# Patient Record
Sex: MALE | Race: OTHER | NOT HISPANIC OR LATINO | Employment: UNEMPLOYED | URBAN - METROPOLITAN AREA
[De-identification: names, ages, dates, MRNs, and addresses within clinical notes are randomized per-mention and may not be internally consistent; named-entity substitution may affect disease eponyms.]

---

## 2017-04-23 ENCOUNTER — HOSPITAL ENCOUNTER (EMERGENCY)
Facility: MEDICAL CENTER | Age: 32
End: 2017-04-23
Attending: EMERGENCY MEDICINE
Payer: MEDICAID

## 2017-04-23 VITALS
TEMPERATURE: 96.9 F | SYSTOLIC BLOOD PRESSURE: 100 MMHG | WEIGHT: 153.22 LBS | DIASTOLIC BLOOD PRESSURE: 76 MMHG | RESPIRATION RATE: 16 BRPM | BODY MASS INDEX: 21.45 KG/M2 | HEART RATE: 90 BPM | OXYGEN SATURATION: 99 % | HEIGHT: 71 IN

## 2017-04-23 DIAGNOSIS — R10.13 EPIGASTRIC PAIN: ICD-10-CM

## 2017-04-23 DIAGNOSIS — D72.829 LEUKOCYTOSIS, UNSPECIFIED TYPE: ICD-10-CM

## 2017-04-23 LAB
ALBUMIN SERPL BCP-MCNC: 4.3 G/DL (ref 3.2–4.9)
ALBUMIN/GLOB SERPL: 1.6 G/DL
ALP SERPL-CCNC: 93 U/L (ref 30–99)
ALT SERPL-CCNC: 13 U/L (ref 2–50)
ANION GAP SERPL CALC-SCNC: 9 MMOL/L (ref 0–11.9)
AST SERPL-CCNC: 12 U/L (ref 12–45)
BASOPHILS # BLD AUTO: 0.3 % (ref 0–1.8)
BASOPHILS # BLD: 0.04 K/UL (ref 0–0.12)
BILIRUB SERPL-MCNC: 1.6 MG/DL (ref 0.1–1.5)
BUN SERPL-MCNC: 10 MG/DL (ref 8–22)
CALCIUM SERPL-MCNC: 9.5 MG/DL (ref 8.5–10.5)
CHLORIDE SERPL-SCNC: 100 MMOL/L (ref 96–112)
CO2 SERPL-SCNC: 24 MMOL/L (ref 20–33)
CREAT SERPL-MCNC: 0.67 MG/DL (ref 0.5–1.4)
EKG IMPRESSION: NORMAL
EOSINOPHIL # BLD AUTO: 0.02 K/UL (ref 0–0.51)
EOSINOPHIL NFR BLD: 0.1 % (ref 0–6.9)
ERYTHROCYTE [DISTWIDTH] IN BLOOD BY AUTOMATED COUNT: 42.5 FL (ref 35.9–50)
GFR SERPL CREATININE-BSD FRML MDRD: >60 ML/MIN/1.73 M 2
GLOBULIN SER CALC-MCNC: 2.7 G/DL (ref 1.9–3.5)
GLUCOSE SERPL-MCNC: 119 MG/DL (ref 65–99)
HCT VFR BLD AUTO: 46.4 % (ref 42–52)
HGB BLD-MCNC: 16.3 G/DL (ref 14–18)
IMM GRANULOCYTES # BLD AUTO: 0.07 K/UL (ref 0–0.11)
IMM GRANULOCYTES NFR BLD AUTO: 0.5 % (ref 0–0.9)
LIPASE SERPL-CCNC: 8 U/L (ref 11–82)
LYMPHOCYTES # BLD AUTO: 1.39 K/UL (ref 1–4.8)
LYMPHOCYTES NFR BLD: 9.6 % (ref 22–41)
MCH RBC QN AUTO: 31.6 PG (ref 27–33)
MCHC RBC AUTO-ENTMCNC: 35.1 G/DL (ref 33.7–35.3)
MCV RBC AUTO: 89.9 FL (ref 81.4–97.8)
MONOCYTES # BLD AUTO: 1.17 K/UL (ref 0–0.85)
MONOCYTES NFR BLD AUTO: 8.1 % (ref 0–13.4)
NEUTROPHILS # BLD AUTO: 11.8 K/UL (ref 1.82–7.42)
NEUTROPHILS NFR BLD: 81.4 % (ref 44–72)
NRBC # BLD AUTO: 0 K/UL
NRBC BLD AUTO-RTO: 0 /100 WBC
PLATELET # BLD AUTO: 220 K/UL (ref 164–446)
PMV BLD AUTO: 9.9 FL (ref 9–12.9)
POTASSIUM SERPL-SCNC: 3.8 MMOL/L (ref 3.6–5.5)
PROT SERPL-MCNC: 7 G/DL (ref 6–8.2)
RBC # BLD AUTO: 5.16 M/UL (ref 4.7–6.1)
SODIUM SERPL-SCNC: 133 MMOL/L (ref 135–145)
WBC # BLD AUTO: 14.5 K/UL (ref 4.8–10.8)

## 2017-04-23 PROCEDURE — 700102 HCHG RX REV CODE 250 W/ 637 OVERRIDE(OP): Performed by: EMERGENCY MEDICINE

## 2017-04-23 PROCEDURE — 93005 ELECTROCARDIOGRAM TRACING: CPT

## 2017-04-23 PROCEDURE — 83690 ASSAY OF LIPASE: CPT

## 2017-04-23 PROCEDURE — 85025 COMPLETE CBC W/AUTO DIFF WBC: CPT

## 2017-04-23 PROCEDURE — A9270 NON-COVERED ITEM OR SERVICE: HCPCS | Performed by: EMERGENCY MEDICINE

## 2017-04-23 PROCEDURE — 80053 COMPREHEN METABOLIC PANEL: CPT

## 2017-04-23 PROCEDURE — 99284 EMERGENCY DEPT VISIT MOD MDM: CPT

## 2017-04-23 RX ORDER — FAMOTIDINE 20 MG/1
20 TABLET, FILM COATED ORAL 2 TIMES DAILY
Status: DISCONTINUED | OUTPATIENT
Start: 2017-04-23 | End: 2017-04-23

## 2017-04-23 RX ADMIN — LIDOCAINE HYDROCHLORIDE 30 ML: 20 SOLUTION OROPHARYNGEAL at 04:09

## 2017-04-23 ASSESSMENT — PAIN SCALES - GENERAL: PAINLEVEL_OUTOF10: 10

## 2017-04-23 NOTE — ED AVS SNAPSHOT
Home Care Instructions                                                                                                                Justice Ortega   MRN: [id number]    Department:  [facility], Emergency Dept   Date of Visit:  4/23/2017            [facility], Emergency Dept    [address] [place] [address]    Phone:  [phone]      You were seen by     Bill Ross M.D.      Your Diagnosis Was     Epigastric pain     R10.13       These are the medications you received during your hospitalization from 04/23/2017 0208 to 04/23/2017 0549     Date/Time Order Dose Route Action    04/23/2017 0409 hyoscyamine-maalox plus-lidocaine viscous (GI COCKTAIL) oral susp 30 mL 30 mL Oral Given      Follow-up Information     1. Follow up with [facility], Emergency Dept.    Specialty:  Emergency Medicine    Why:  If symptoms worsen    Contact information    [address] [place] [id number] [phone]        2. Follow up with [facility] In 1 day.    Why:  for a recheck    Contact information    [address] [place] [address] [phone]      Medication Information     Review all of your home medications and newly ordered medications with your primary doctor and/or pharmacist as soon as possible. Follow medication instructions as directed by your doctor and/or pharmacist.     Please keep your complete medication list with you and share with your physician. Update the information when medications are discontinued, doses are changed, or new medications (including over-the-counter products) are added; and carry medication information at all times in the event of emergency situations.               Medication List      ASK your doctor about these medications        Instructions    Morning Afternoon Evening Bedtime    hydrocodone-acetaminophen 5-325 MG Tabs per tablet   Commonly known as:  NORCO        Take 1-2 Tabs by mouth every 6  hours as needed.   Dose:  1-2 Tab                                Procedures and tests performed during your visit     CBC WITH DIFFERENTIAL    COMP METABOLIC PANEL    EKG (NOW)    ESTIMATED GFR    IV Saline Lock    LIPASE        Discharge Instructions       Abdominal Pain (Nonspecific)  Your exam might not show the exact reason you have abdominal pain. Since there are many different causes of abdominal pain, another checkup and more tests may be needed. It is very important to follow up for lasting (persistent) or worsening symptoms. A possible cause of abdominal pain in any person who still has his or her appendix is acute appendicitis. Appendicitis is often hard to diagnose. Normal blood tests, urine tests, ultrasound, and CT scans do not completely rule out early appendicitis or other causes of abdominal pain. Sometimes, only the changes that happen over time will allow appendicitis and other causes of abdominal pain to be determined. Other potential problems that may require surgery may also take time to become more apparent. Because of this, it is important that you follow all of the instructions below.  HOME CARE INSTRUCTIONS   · Rest as much as possible.   · Do not eat solid food until your pain is gone.   · While adults or children have pain: A diet of water, weak decaffeinated tea, broth or bouillon, gelatin, oral rehydration solutions (ORS), frozen ice pops, or ice chips may be helpful.   · When pain is gone in adults or children: Start a light diet (dry toast, crackers, applesauce, or white rice). Increase the diet slowly as long as it does not bother you. Eat no dairy products (including cheese and eggs) and no spicy, fatty, fried, or high-fiber foods.   · Use no alcohol, caffeine, or cigarettes.   · Take your regular medicines unless your caregiver told you not to.   · Take any prescribed medicine as directed.   · Only take over-the-counter or prescription medicines for pain, discomfort, or fever as  directed by your caregiver. Do not give aspirin to children.   If your caregiver has given you a follow-up appointment, it is very important to keep that appointment. Not keeping the appointment could result in a permanent injury and/or lasting (chronic) pain and/or disability. If there is any problem keeping the appointment, you must call to reschedule.   SEEK IMMEDIATE MEDICAL CARE IF:   · Your pain is not gone in 24 hours.   · Your pain becomes worse, changes location, or feels different.   · You or your child has an oral temperature above 102° F (38.9° C), not controlled by medicine.   · Your baby is older than 3 months with a rectal temperature of 102° F (38.9° C) or higher.   · Your baby is 3 months old or younger with a rectal temperature of 100.4° F (38° C) or higher.   · You have shaking chills.   · You keep throwing up (vomiting) or cannot drink liquids.   · There is blood in your vomit or you see blood in your bowel movements.   · Your bowel movements become dark or black.   · You have frequent bowel movements.   · Your bowel movements stop (become blocked) or you cannot pass gas.   · You have bloody, frequent, or painful urination.   · You have yellow discoloration in the skin or whites of the eyes.   · Your stomach becomes bloated or bigger.   · You have dizziness or fainting.   · You have chest or back pain.   MAKE SURE YOU:   · Understand these instructions.   · Will watch your condition.   · Will get help right away if you are not doing well or get worse.   Document Released: 12/18/2006 Document Revised: 03/11/2013 Document Reviewed: 11/15/2010  ExitCare® Patient Information ©2013 [employer].          Patient Information     Patient Information    Following emergency treatment: all patient requiring follow-up care must return either to a private physician or a clinic if your condition worsens before you are able to obtain further medical attention, please return to the emergency room.     Billing  Information    At [facility], we work to make the billing process streamlined for our patients.  Our Representatives are here to answer any questions you may have regarding your hospital bill.  If you have insurance coverage and have supplied your insurance information to us, we will submit a claim to your insurer on your behalf.  Should you have any questions regarding your bill, we can be reached online or by phone as follows:  Online: You are able pay your bills online or live chat with our representatives about any billing questions you may have. We are here to help Monday - Friday from 8:00am to 7:30pm and 9:00am - 12:00pm on Saturdays.  Please visit [email or web address]  for more information.   Phone:  [phone] or [phone]    Please note that your emergency physician, surgeon, pathologist, radiologist, anesthesiologist, and other specialists are not employed by [facility] and will therefore bill separately for their services.  Please contact them directly for any questions concerning their bills at the numbers below:     Emergency Physician Services:  [phone]  [facility]:  [phone]  [facility]:  [phone]  [facility]:  [phone]    1. Your final bill may vary from the amount quoted upon discharge if all procedures are not complete at that time, or if your doctor has additional procedures of which we are not aware. You will receive an additional bill if you return to the Emergency Department at [facility] for suture removal regardless of the facility of which the sutures were placed.     2. Please arrange for settlement of this account at the emergency registration.    3. All self-pay accounts are due in full at the time of treatment.  If you are unable to meet this obligation then payment is expected within 4-5 days.     4. If you have had radiology studies (CT, X-ray, Ultrasound, MRI), you have  received a preliminary result during your emergency department visit. Please contact the radiology department [phone] to receive a copy of your final result. Please discuss the Final result with your primary physician or with the follow up physician provided.     Crisis Hotline:  [place] Crisis Hotline:  0-102-RMLENYF or 1-344.301.3749  [place] Crisis Hotline:    1-949.621.8434 or 566-265-4826         ED Discharge Follow Up Questions    1. In order to provide you with very good care, we would like to follow up with a phone call in the next few days.  May we have your permission to contact you?     YES /  NO    2. What is the best phone number to call you? (       )_____-__________    3. What is the best time to call you?      Morning  /  Afternoon  /  Evening                   Patient Signature:  ____________________________________________________________    Date:  ____________________________________________________________

## 2017-04-23 NOTE — ED AVS SNAPSHOT
MapMy[employer]itness Access Code: [email or web address]  Expires: 5/23/2017  5:49 AM    Your email address is not on file at [facility].  Email Addresses are required for you to sign up for [employer]My[employer], please contact [phone] to verify your personal information and to provide your email address prior to attempting to register for MapMy[employer]itness.    Justice Ortega  [address] [place] [address], [place] [address]    Careers[employer]  A secure, online tool to manage your health information     [facility]’s MapMyFitness® is a secure, online tool that connects you to your personalized health information from the privacy of your home -- day or night - making it very easy for you to manage your healthcare. Once the activation process is completed, you can even access your medical information using the MapMyFitness kiera, which is available for free in the Apple Kiera store or Google Play store.     To learn more about MapMyFitness, visit [email or web address]    There are two levels of access available (as shown below):   My Chart Features  [facility] Primary Care Doctor [facility]  Specialists [facility]  Urgent  Care Non-[facility] Primary Care Doctor   Email your healthcare team securely and privately 24/7 X X X    Manage appointments: schedule your next appointment; view details of past/upcoming appointments X      Request prescription refills. X      View recent personal medical records, including lab and immunizations X X X X   View health record, including health history, allergies, medications X X X X   Read reports about your outpatient visits, procedures, consult and ER notes X X X X   See your discharge summary, which is a recap of your hospital and/or ER visit that includes your diagnosis, lab results, and care plan X X  X     How to register for Careers[employer]:  Once your e-mail address has been verified, follow the following steps to sign up for Careers[employer].     1. Go to  [email or web address]  2. Click on the Sign Up Now box, which takes you to the New Member Sign Up page. You will need  to provide the following information:  a. Enter your "[employer] Access Code exactly as it appears at the top of this page. (You will not need to use this code after you’ve completed the sign-up process. If you do not sign up before the expiration date, you must request a new code.)   b. Enter your date of birth.   c. Enter your home email address.   d. Click Submit, and follow the next screen’s instructions.  3. Create a "[employer] ID. This will be your "[employer] login ID and cannot be changed, so think of one that is secure and easy to remember.  4. Create a "[employer] password. You can change your password at any time.  5. Enter your Password Reset Question and Answer. This can be used at a later time if you forget your password.   6. Enter your e-mail address. This allows you to receive e-mail notifications when new information is available in "[employer].  7. Click Sign Up. You can now view your health information.    For assistance activating your "[employer] account, call [phone]

## 2017-04-23 NOTE — DISCHARGE INSTRUCTIONS
Abdominal Pain (Nonspecific)  Your exam might not show the exact reason you have abdominal pain. Since there are many different causes of abdominal pain, another checkup and more tests may be needed. It is very important to follow up for lasting (persistent) or worsening symptoms. A possible cause of abdominal pain in any person who still has his or her appendix is acute appendicitis. Appendicitis is often hard to diagnose. Normal blood tests, urine tests, ultrasound, and CT scans do not completely rule out early appendicitis or other causes of abdominal pain. Sometimes, only the changes that happen over time will allow appendicitis and other causes of abdominal pain to be determined. Other potential problems that may require surgery may also take time to become more apparent. Because of this, it is important that you follow all of the instructions below.  HOME CARE INSTRUCTIONS   · Rest as much as possible.   · Do not eat solid food until your pain is gone.   · While adults or children have pain: A diet of water, weak decaffeinated tea, broth or bouillon, gelatin, oral rehydration solutions (ORS), frozen ice pops, or ice chips may be helpful.   · When pain is gone in adults or children: Start a light diet (dry toast, crackers, applesauce, or white rice). Increase the diet slowly as long as it does not bother you. Eat no dairy products (including cheese and eggs) and no spicy, fatty, fried, or high-fiber foods.   · Use no alcohol, caffeine, or cigarettes.   · Take your regular medicines unless your caregiver told you not to.   · Take any prescribed medicine as directed.   · Only take over-the-counter or prescription medicines for pain, discomfort, or fever as directed by your caregiver. Do not give aspirin to children.   If your caregiver has given you a follow-up appointment, it is very important to keep that appointment. Not keeping the appointment could result in a permanent injury and/or lasting (chronic) pain  and/or disability. If there is any problem keeping the appointment, you must call to reschedule.   SEEK IMMEDIATE MEDICAL CARE IF:   · Your pain is not gone in 24 hours.   · Your pain becomes worse, changes location, or feels different.   · You or your child has an oral temperature above 102° F (38.9° C), not controlled by medicine.   · Your baby is older than 3 months with a rectal temperature of 102° F (38.9° C) or higher.   · Your baby is 3 months old or younger with a rectal temperature of 100.4° F (38° C) or higher.   · You have shaking chills.   · You keep throwing up (vomiting) or cannot drink liquids.   · There is blood in your vomit or you see blood in your bowel movements.   · Your bowel movements become dark or black.   · You have frequent bowel movements.   · Your bowel movements stop (become blocked) or you cannot pass gas.   · You have bloody, frequent, or painful urination.   · You have yellow discoloration in the skin or whites of the eyes.   · Your stomach becomes bloated or bigger.   · You have dizziness or fainting.   · You have chest or back pain.   MAKE SURE YOU:   · Understand these instructions.   · Will watch your condition.   · Will get help right away if you are not doing well or get worse.   Document Released: 12/18/2006 Document Revised: 03/11/2013 Document Reviewed: 11/15/2010  ExitCare® Patient Information ©2013 [employer].

## 2017-04-23 NOTE — ED PROVIDER NOTES
"ED Provider Note    Scribed for Bill Ross M.D. by Butch Epps. 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 It is of a sharp quality and it does not radiate. Pain has been resolved at this time with a GI cocktail. He denies any emesis or diarrhea. Patient reportedly drinks several beers daily and smokes 1 pack of cigarettes a day. He denies any history of drug abuse.    REVIEW OF SYSTEMS  Pertinent positives include epigastric pain.   Pertinent negatives include no emesis or diarrhea.    E    PAST MEDICAL HISTORY   has a past medical history of Psychiatric disorder.    SURGICAL HISTORY   has past surgical history that includes neck exploration (Left, 8/14/2015) and wound closure general (Left, 8/14/2015).    SOCIAL HISTORY  Social History   Substance Use Topics   • Smoking status: Current Every Day Smoker -- 1.00 packs/day     Types: Cigarettes        • Alcohol Use: Yes      Comment: several beers daily.      History   Drug Use No     Comment: Denies; Hx of heroin     FAMILY HISTORY  No pertinent family history noted.    CURRENT MEDICATIONS  No current facility-administered medications on file prior to encounter.     Current Outpatient Prescriptions on File Prior to Encounter   Medication Sig Dispense Refill   • hydrocodone-acetaminophen (NORCO) 5-325 MG Tab per tablet Take 1-2 Tabs by mouth every 6 hours as needed. 20 Tab 0     ALLERGIES  No Known Allergies    PHYSICAL EXAM  VITAL SIGNS: /76 mmHg  Pulse 90  Temp(Src) 36.1 °C (96.9 °F)  Resp 16  Ht 1.803 m (5' 11\")  Wt 69.5 kg (153 lb 3.5 oz)  BMI 21.38 kg/m2  SpO2 100%     Nursing note and vitals " reviewed.  Constitutional: Well-developed and well-nourished. No distress.   HENT: Head is normocephalic and atraumatic. Oropharynx is clear and moist without exudate or erythema.   Eyes: Pupils are equal, round, and reactive to light. Conjunctiva are normal.   Cardiovascular: Normal rate and regular rhythm. No murmur heard. Normal radial pulses.  Pulmonary/Chest: Breath sounds normal. No wheezes or rales.   Abdominal: Soft. No distention. Negative anderson's sign. Unable to elicit any abdominal tenderness.    Musculoskeletal: Extremities exhibit normal range of motion without edema or tenderness.   Neurological: Awake, alert and oriented to person, place, and time. No focal deficits noted.  Skin: Skin is warm and dry. No rash.   Psychiatric: Normal mood and affect. Appropriate for clinical situation    DIAGNOSTIC STUDIES / PROCEDURES    EKG Interpretation  See my interpretation below.    LABS  Results for orders placed or performed during the hospital encounter of 04/23/17   CBC WITH DIFFERENTIAL   Result Value Ref Range    WBC 14.5 (H) 4.8 - 10.8 K/uL    RBC 5.16 4.70 - 6.10 M/uL    Hemoglobin 16.3 14.0 - 18.0 g/dL    Hematocrit 46.4 42.0 - 52.0 %    MCV 89.9 81.4 - 97.8 fL    MCH 31.6 27.0 - 33.0 pg    MCHC 35.1 33.7 - 35.3 g/dL    RDW 42.5 35.9 - 50.0 fL    Platelet Count 220 164 - 446 K/uL    MPV 9.9 9.0 - 12.9 fL    Neutrophils-Polys 81.40 (H) 44.00 - 72.00 %    Lymphocytes 9.60 (L) 22.00 - 41.00 %    Monocytes 8.10 0.00 - 13.40 %    Eosinophils 0.10 0.00 - 6.90 %    Basophils 0.30 0.00 - 1.80 %    Immature Granulocytes 0.50 0.00 - 0.90 %    Nucleated RBC 0.00 /100 WBC    Neutrophils (Absolute) 11.80 (H) 1.82 - 7.42 K/uL    Lymphs (Absolute) 1.39 1.00 - 4.80 K/uL    Monos (Absolute) 1.17 (H) 0.00 - 0.85 K/uL    Eos (Absolute) 0.02 0.00 - 0.51 K/uL    Baso (Absolute) 0.04 0.00 - 0.12 K/uL    Immature Granulocytes (abs) 0.07 0.00 - 0.11 K/uL    NRBC (Absolute) 0.00 K/uL   COMP METABOLIC PANEL   Result Value Ref  Range    Sodium 133 (L) 135 - 145 mmol/L    Potassium 3.8 3.6 - 5.5 mmol/L    Chloride 100 96 - 112 mmol/L    Co2 24 20 - 33 mmol/L    Anion Gap 9.0 0.0 - 11.9    Glucose 119 (H) 65 - 99 mg/dL    Bun 10 8 - 22 mg/dL    Creatinine 0.67 0.50 - 1.40 mg/dL    Calcium 9.5 8.5 - 10.5 mg/dL    AST(SGOT) 12 12 - 45 U/L    ALT(SGPT) 13 2 - 50 U/L    Alkaline Phosphatase 93 30 - 99 U/L    Total Bilirubin 1.6 (H) 0.1 - 1.5 mg/dL    Albumin 4.3 3.2 - 4.9 g/dL    Total Protein 7.0 6.0 - 8.2 g/dL    Globulin 2.7 1.9 - 3.5 g/dL    A-G Ratio 1.6 g/dL   LIPASE   Result Value Ref Range    Lipase 8 (L) 11 - 82 U/L   ESTIMATED GFR   Result Value Ref Range    GFR If African American >60 >60 mL/min/1.73 m 2    GFR If Non African American >60 >60 mL/min/1.73 m 2   EKG (NOW)   Result Value Ref Range    Report       [facility] Emergency Dept.    Test Date:  2017  Pt Name:    EUGENE RUEDA                Department: ER  MRN:        [id number]                      Room:  Gender:                                 Technician: [id number]  :        1985                   Requested By:ER TRIAGE PROTOCOL  Order #:    [id number]                    Reading MD: DIAN GUADARRAMA MD    Measurements  Intervals                                Axis  Rate:       60                           P:          69  KS:         124                          QRS:        66  QRSD:       84                           T:          58  QT:         436  QTc:        436    Interpretive Statements  SINUS RHYTHM  ATRIAL PREMATURE COMPLEX  BASELINE WANDER IN LEAD(S)V3,V4, V5,V6  Compared to ECG 2015 02:22:37  Atrial premature complex(es) now present  Prolonged QT interval no longer present    Electronically Signed On 2017 7:55:21 PDT by DIAN GUADARRAMA MD       All labs reviewed by me.    COURSE & MEDICAL DECISION MAKING  Nursing notes, VS, PMSFHx reviewed in chart.     Review of past medical records shows the patient was last seen here 2016  for nephrolithiasis.     4:11 AM - Patient seen and examined at bedside. Patient will be treated with GI cocktail oral suspension 30 ml. Ordered estimated EKG, GFR CBC with differential, CMP, and Lipase to evaluate his symptoms. The differential diagnoses include but are not limited to: gastritis vs pancratitis vs peptic ulcer disease     5:30 AM The patient was discharged home with an information sheet on abdominal pain and told to return immediately for any signs or symptoms listed.  The patient agreed to the discharge precautions and follow-up plan which is documented in [facility].    7:45 AM The patient presents today with nausea and abdominal pain and the signs and symptoms consistent with a viral upper respiratory infection. The patient has a benign abdominal exam as well as a a normal pulse oximetry on room air and a normal pulmonary exam. I feel that the likelihood of pneumonia is low and there is no tenderness to make me concerned for more serious intra-abdominal pathology.    7:49 AM The patient was discharged home with an information sheet on abdominal pain and told to return immediately for any signs or symptoms listed. The patient agreed to the discharge precautions and follow-up plan which is documented in [facility].    The patient will return for new or worsening symptoms and is stable at the time of discharge.    The patient is referred to a primary physician for blood pressure management, diabetic screening, and for all other preventative health concerns.    DISPOSITION:  Patient will be discharged home in stable condition.    FOLLOW UP:  [facility] Emergency Dept  [address] [facility] [address] [phone]    If symptoms worsen    [address] [address]  [phone]  In 1 day  for a recheck    FINAL IMPRESSION  1. Epigastric pain    2. Leukocytosis, unspecified type        I, Butch Epps (Scribleslie), am scribing for, and in the  presence of, Bill Ross M.D..    Electronically signed by: Butch Epps (Scribe), 4/23/2017    I, Bill Ross M.D. personally performed the services described in this documentation, as scribed by Butch Epps in my presence, and it is both accurate and complete.    The note accurately reflects work and decisions made by me.  Bill Ross  4/23/2017  10:41 AM

## 2017-04-23 NOTE — ED NOTES
"Pt BIB [facility] from drop in center. Pt ambulatory to triage, c/o \"burning\" epigastric pain x 2 days. Denies SOB or n/v. VSS. Educated on triage process, encouraged to inform staff of any changes. Pt taken back for EKG.   "

## 2017-04-23 NOTE — ED AVS SNAPSHOT
4/23/2017    Justice Ortega  [address] [place] [address] Street  [place] [address]    Dear Justice:    [facility] wants to ensure your discharge home is safe and you or your loved ones have had all of your questions answered regarding your care after you leave the hospital.    Below is a list of resources and contact information should you have any questions regarding your hospital stay, follow-up instructions, or active medical symptoms.    Questions or Concerns Regarding… Contact   Medical Questions Related to Your Discharge  (7 days a week, 8am-5pm) Contact a Nurse Care Coordinator   [phone]   Medical Questions Not Related to Your Discharge  (24 hours a day / 7 days a week)  Contact the Nurse Health Line   [phone]    Medications or Discharge Instructions Refer to your discharge packet   or contact your [facility] Primary Care Provider   [phone]   Follow-up Appointment(s) Schedule your appointment via [employer]   or contact Scheduling [phone]   Billing Review your statement via [employer]  or contact Billing [phone]   Medical Records Review your records via [employer]   or contact Medical Records [phone]     You may receive a telephone call within two days of discharge. This call is to make certain you understand your discharge instructions and have the opportunity to have any questions answered. You can also easily access your medical information, test results and upcoming appointments via the [employer] free online health management tool. You can learn more and sign up at [employer]. For assistance setting up your [employer] account, please call [phone].    Once again, we want to ensure your discharge home is safe and that you have a clear understanding of any next steps in your care. If you have any questions or concerns, please do not hesitate to contact us, we are here for you. Thank you for choosing [facility] for your healthcare needs.    Sincerely,    Your [facility] Healthcare Team

## 2017-04-27 ENCOUNTER — HOSPITAL ENCOUNTER (EMERGENCY)
Facility: MEDICAL CENTER | Age: 32
End: 2017-04-28
Attending: EMERGENCY MEDICINE
Payer: MEDICAID

## 2017-04-27 ENCOUNTER — HOSPITAL ENCOUNTER (EMERGENCY)
Dept: HOSPITAL 8 - ED | Age: 32
End: 2017-04-27
Payer: SELF-PAY

## 2017-04-27 VITALS — WEIGHT: 132.28 LBS | HEIGHT: 71 IN | BODY MASS INDEX: 18.52 KG/M2

## 2017-04-27 VITALS — SYSTOLIC BLOOD PRESSURE: 139 MMHG | DIASTOLIC BLOOD PRESSURE: 93 MMHG

## 2017-04-27 DIAGNOSIS — E87.1: ICD-10-CM

## 2017-04-27 DIAGNOSIS — R07.89: Primary | ICD-10-CM

## 2017-04-27 DIAGNOSIS — B34.9: ICD-10-CM

## 2017-04-27 DIAGNOSIS — M79.10 MYALGIA: ICD-10-CM

## 2017-04-27 DIAGNOSIS — R07.9 CHEST PAIN, UNSPECIFIED TYPE: ICD-10-CM

## 2017-04-27 DIAGNOSIS — F10.20: ICD-10-CM

## 2017-04-27 LAB
AST SERPL-CCNC: 16 U/L (ref 15–37)
BUN SERPL-MCNC: 10 MG/DL (ref 7–18)
EKG IMPRESSION: NORMAL
IS PT STATUS REG ER OR PRE ER?: YES

## 2017-04-27 PROCEDURE — 36415 COLL VENOUS BLD VENIPUNCTURE: CPT

## 2017-04-27 PROCEDURE — 80053 COMPREHEN METABOLIC PANEL: CPT

## 2017-04-27 PROCEDURE — 93005 ELECTROCARDIOGRAM TRACING: CPT

## 2017-04-27 PROCEDURE — 85025 COMPLETE CBC W/AUTO DIFF WBC: CPT

## 2017-04-27 PROCEDURE — 84484 ASSAY OF TROPONIN QUANT: CPT

## 2017-04-27 PROCEDURE — 93005 ELECTROCARDIOGRAM TRACING: CPT | Performed by: EMERGENCY MEDICINE

## 2017-04-27 PROCEDURE — 99284 EMERGENCY DEPT VISIT MOD MDM: CPT

## 2017-04-27 PROCEDURE — 71010: CPT

## 2017-04-27 PROCEDURE — 99285 EMERGENCY DEPT VISIT HI MDM: CPT

## 2017-04-27 ASSESSMENT — LIFESTYLE VARIABLES
DO YOU DRINK ALCOHOL: YES
CONSUMPTION TOTAL: INCOMPLETE
EVER HAD A DRINK FIRST THING IN THE MORNING TO STEADY YOUR NERVES TO GET RID OF A HANGOVER: NO
TOTAL SCORE: 0
HAVE PEOPLE ANNOYED YOU BY CRITICIZING YOUR DRINKING: NO
TOTAL SCORE: 0
TOTAL SCORE: 0
EVER FELT BAD OR GUILTY ABOUT YOUR DRINKING: NO
HAVE YOU EVER FELT YOU SHOULD CUT DOWN ON YOUR DRINKING: NO

## 2017-04-27 ASSESSMENT — PAIN SCALES - GENERAL: PAINLEVEL_OUTOF10: 8

## 2017-04-27 NOTE — ED AVS SNAPSHOT
NeoStem Access Code: [email or web address]  Expires: 5/23/2017  5:49 AM    Your email address is not on file at [facility].  Email Addresses are required for you to sign up for NeoStem, please contact [phone] to verify your personal information and to provide your email address prior to attempting to register for NeoStem.    Justice Ortega  [address] [place] [address], [place] [address]    [employer]  A secure, online tool to manage your health information     [facility]’s NeoStem® is a secure, online tool that connects you to your personalized health information from the privacy of your home -- day or night - making it very easy for you to manage your healthcare. Once the activation process is completed, you can even access your medical information using the NeoStem kiera, which is available for free in the Apple Kiera store or Google Play store.     To learn more about NeoStem, visit [email or web address]."[email or web address][email or web address]    There are two levels of access available (as shown below):   My Chart Features  [facility] Primary Care Doctor [facility]  Specialists [facility]  Urgent  Care Non-[facility] Primary Care Doctor   Email your healthcare team securely and privately 24/7 X X X    Manage appointments: schedule your next appointment; view details of past/upcoming appointments X      Request prescription refills. X      View recent personal medical records, including lab and immunizations X X X X   View health record, including health history, allergies, medications X X X X   Read reports about your outpatient visits, procedures, consult and ER notes X X X X   See your discharge summary, which is a recap of your hospital and/or ER visit that includes your diagnosis, lab results, and care plan X X  X     How to register for [employer]:  Once your e-mail address has been verified, follow the following steps to sign up for [employer].     1. Go to  [email or web address]  2. Click on the Sign Up Now box, which takes you to the New Member Sign Up page. You will need  to provide the following information:  a. Enter your [employer] Access Code exactly as it appears at the top of this page. (You will not need to use this code after you’ve completed the sign-up process. If you do not sign up before the expiration date, you must request a new code.)   b. Enter your date of birth.   c. Enter your home email address.   d. Click Submit, and follow the next screen’s instructions.  3. Create a [employer] ID. This will be your [employer] login ID and cannot be changed, so think of one that is secure and easy to remember.  4. Create a [employer] password. You can change your password at any time.  5. Enter your Password Reset Question and Answer. This can be used at a later time if you forget your password.   6. Enter your e-mail address. This allows you to receive e-mail notifications when new information is available in [employer].  7. Click Sign Up. You can now view your health information.    For assistance activating your [employer] account, call [phone]

## 2017-04-27 NOTE — ED AVS SNAPSHOT
Home Care Instructions                                                                                                                Justice Ortega   MRN: [id number]    Department:  [facility], Emergency Dept   Date of Visit:  4/27/2017            [facility], Emergency Dept    [address] [facility] [address]    Phone:  [phone]      You were seen by     Usha Perez D.O.      Your Diagnosis Was     Myalgia     M79.1       These are the medications you received during your hospitalization from 04/27/2017 2139 to 04/28/2017 0256     Date/Time Order Dose Route Action    04/28/2017 0042 NS infusion 1,000 mL 1,000 mL Intravenous New Bag    04/28/2017 0121 ondansetron (ZOFRAN) syringe/vial injection 4 mg 4 mg Intravenous Given    04/28/2017 0209 ketorolac (TORADOL) injection 30 mg Intravenous Given      Follow-up Information     1. Follow up with [facility]. Schedule an appointment as soon as possible for a visit in 1 day.    Contact information    [address] [place] [address] [phone]        2. Follow up with [facility], Emergency Dept.    Specialty:  Emergency Medicine    Why:  please return to the emergency Department with any worsening signs or symptoms including worsening chest pain, passing out, or inability to take anything by mouth.    Contact information    [address] [facility] [address] [phone]      Medication Information     Review all of your home medications and newly ordered medications with your primary doctor and/or pharmacist as soon as possible. Follow medication instructions as directed by your doctor and/or pharmacist.     Please keep your complete medication list with you and share with your physician. Update the information when medications are discontinued, doses are changed, or new medications (including over-the-counter products) are added; and carry medication information at all times in  the event of emergency situations.               Medication List      Notice     You have not been prescribed any medications.            Procedures and tests performed during your visit     EKG (ER)    IV Saline Lock    NURSING COMMUNICATION        Discharge Instructions       Chest Wall Pain  Chest wall pain is pain in or around the bones and muscles of your chest. It may take up to 6 weeks to get better. It may take longer if you must stay physically active in your work and activities.   CAUSES   Chest wall pain may happen on its own. However, it may be caused by:  · A viral illness like the flu.  · Injury.  · Coughing.  · Exercise.  · Arthritis.  · Fibromyalgia.  · Shingles.  HOME CARE INSTRUCTIONS   · Avoid overtiring physical activity. Try not to strain or perform activities that cause pain. This includes any activities using your chest or your abdominal and side muscles, especially if heavy weights are used.  · Put ice on the sore area.  ¨ Put ice in a plastic bag.  ¨ Place a towel between your skin and the bag.  ¨ Leave the ice on for 15-20 minutes per hour while awake for the first 2 days.  · Only take over-the-counter or prescription medicines for pain, discomfort, or fever as directed by your caregiver.  SEEK IMMEDIATE MEDICAL CARE IF:   · Your pain increases, or you are very uncomfortable.  · You have a fever.  · Your chest pain becomes worse.  · You have new, unexplained symptoms.  · You have nausea or vomiting.  · You feel sweaty or lightheaded.  · You have a cough with phlegm (sputum), or you cough up blood.  MAKE SURE YOU:   · Understand these instructions.  · Will watch your condition.  · Will get help right away if you are not doing well or get worse.     This information is not intended to replace advice given to you by your health care provider. Make sure you discuss any questions you have with your health care provider.     Document Released: 12/18/2006 Document Revised: 03/11/2013 Document  Reviewed: 03/14/2016  [employer] Interactive Patient Education ©2016 Elsevier Inc.    Chest Pain, Nonspecific  It is often hard to give a specific diagnosis for the cause of chest pain. There is always a chance that your pain could be related to something serious, like a heart attack or a blood clot in the lungs. You need to follow up with your caregiver for further evaluation. More lab tests or other studies such as X-rays, electrocardiography, stress testing, or cardiac imaging may be needed to find the cause of your pain.  Most of the time, nonspecific chest pain improves within 2 to 3 days with rest and mild pain medicine. For the next few days, avoid physical exertion or activities that bring on pain. Do not smoke. Avoid drinking alcohol. Call your caregiver for routine follow-up as advised.   SEEK IMMEDIATE MEDICAL CARE IF:  · You develop increased chest pain or pain that radiates to the arm, neck, jaw, back, or abdomen.   · You develop shortness of breath, increased coughing, or you start coughing up blood.   · You have severe back or abdominal pain, nausea, or vomiting.   · You develop severe weakness, fainting, fever, or chills.   Document Released: 12/18/2006 Document Revised: 03/11/2013 Document Reviewed: 06/06/2008  SKY Mobile[employer]® Patient Information ©2013 [employer].  Muscle Pain, Adult  Muscle pain (myalgia) may be caused by many things, including:  · Overuse or muscle strain, especially if you are not in shape. This is the most common cause of muscle pain.  · Injury.  · Bruises.  · Viruses, such as the flu.  · Infectious diseases.  · Fibromyalgia, which is a chronic condition that causes muscle tenderness, fatigue, and headache.  · Autoimmune diseases, including lupus.  · Certain drugs, including ACE inhibitors and statins.  Muscle pain may be mild or severe. In most cases, the pain lasts only a short time and goes away without treatment. To diagnose the cause of your muscle pain, your health care  provider will take your medical history. This means he or she will ask you when your muscle pain began and what has been happening. If you have not had muscle pain for very long, your health care provider may want to wait before doing much testing. If your muscle pain has lasted a long time, your health care provider may want to run tests right away. If your health care provider thinks your muscle pain may be caused by illness, you may need to have additional tests to rule out certain conditions.   Treatment for muscle pain depends on the cause. Home care is often enough to relieve muscle pain. Your health care provider may also prescribe anti-inflammatory medicine.  HOME CARE INSTRUCTIONS  Watch your condition for any changes. The following actions may help to lessen any discomfort you are feeling:  · Only take over-the-counter or prescription medicines as directed by your health care provider.  · Apply ice to the sore muscle:  ¨ Put ice in a plastic bag.  ¨ Place a towel between your skin and the bag.  ¨ Leave the ice on for 15-20 minutes, 3-4 times a day.  · You may alternate applying hot and cold packs to the muscle as directed by your health care provider.  · If overuse is causing your muscle pain, slow down your activities until the pain goes away.  ¨ Remember that it is normal to feel some muscle pain after starting a workout program. Muscles that have not been used often will be sore at first.  ¨ Do regular, gentle exercises if you are not usually active.  ¨ Warm up before exercising to lower your risk of muscle pain.  · Do not continue working out if the pain is very bad. Bad pain could mean you have injured a muscle.  SEEK MEDICAL CARE IF:  · Your muscle pain gets worse, and medicines do not help.  · You have muscle pain that lasts longer than 3 days.  · You have a rash or fever along with muscle pain.  · You have muscle pain after a tick bite.  · You have muscle pain while working out, even though you are  in good physical condition.  · You have redness, soreness, or swelling along with muscle pain.  · You have muscle pain after starting a new medicine or changing the dose of a medicine.  SEEK IMMEDIATE MEDICAL CARE IF:  · You have trouble breathing.  · You have trouble swallowing.  · You have muscle pain along with a stiff neck, fever, and vomiting.  · You have severe muscle weakness or cannot move part of your body.  MAKE SURE YOU:   · Understand these instructions.  · Will watch your condition.  · Will get help right away if you are not doing well or get worse.     This information is not intended to replace advice given to you by your health care provider. Make sure you discuss any questions you have with your health care provider.     Document Released: 11/09/2007 Document Revised: 01/08/2016 Document Reviewed: 10/14/2014  [employer] Interactive Patient Education ©2016 [employer]            Patient Information     Patient Information    Following emergency treatment: all patient requiring follow-up care must return either to a private physician or a clinic if your condition worsens before you are able to obtain further medical attention, please return to the emergency room.     Billing Information    At [facility], we work to make the billing process streamlined for our patients.  Our Representatives are here to answer any questions you may have regarding your hospital bill.  If you have insurance coverage and have supplied your insurance information to us, we will submit a claim to your insurer on your behalf.  Should you have any questions regarding your bill, we can be reached online or by phone as follows:  Online: You are able pay your bills online or live chat with our representatives about any billing questions you may have. We are here to help Monday - Friday from 8:00am to 7:30pm and 9:00am - 12:00pm on Saturdays.  Please visit [email or web address]  for more  information.   Phone:  [phone] or [phone]    Please note that your emergency physician, surgeon, pathologist, radiologist, anesthesiologist, and other specialists are not employed by [facility] and will therefore bill separately for their services.  Please contact them directly for any questions concerning their bills at the numbers below:     Emergency Physician Services:  [phone]  [facility]:  [phone]  [facility]:  [phone]  [facility]:  [phone]    1. Your final bill may vary from the amount quoted upon discharge if all procedures are not complete at that time, or if your doctor has additional procedures of which we are not aware. You will receive an additional bill if you return to the Emergency Department at [facility] for suture removal regardless of the facility of which the sutures were placed.     2. Please arrange for settlement of this account at the emergency registration.    3. All self-pay accounts are due in full at the time of treatment.  If you are unable to meet this obligation then payment is expected within 4-5 days.     4. If you have had radiology studies (CT, X-ray, Ultrasound, MRI), you have received a preliminary result during your emergency department visit. Please contact the radiology department [phone] to receive a copy of your final result. Please discuss the Final result with your primary physician or with the follow up physician provided.     Crisis Hotline:  [place] Crisis Hotline:  9-783-BSYGZJE or 1-642.252.5171  [place] Crisis Hotline:    1-560.974.5928 or 532-696-1497         ED Discharge Follow Up Questions    1. In order to provide you with very good care, we would like to follow up with a phone call in the next few days.  May we have your permission to contact you?     YES /  NO    2. What is the best phone number to call you? (       )_____-__________    3. What is the best time to call  you?      Morning  /  Afternoon  /  Evening                   Patient Signature:  ____________________________________________________________    Date:  ____________________________________________________________

## 2017-04-27 NOTE — ED AVS SNAPSHOT
4/28/2017    Justice Ortega  [address] [place] [address] Street  [place] [address]    Dear Justice:    [facility] wants to ensure your discharge home is safe and you or your loved ones have had all of your questions answered regarding your care after you leave the hospital.    Below is a list of resources and contact information should you have any questions regarding your hospital stay, follow-up instructions, or active medical symptoms.    Questions or Concerns Regarding… Contact   Medical Questions Related to Your Discharge  (7 days a week, 8am-5pm) Contact a Nurse Care Coordinator   [phone]   Medical Questions Not Related to Your Discharge  (24 hours a day / 7 days a week)  Contact the Nurse Health Line   [phone]    Medications or Discharge Instructions Refer to your discharge packet   or contact your [facility] Primary Care Provider   [phone]   Follow-up Appointment(s) Schedule your appointment via [employer]   or contact Scheduling [phone]   Billing Review your statement via [employer]  or contact Billing [phone]   Medical Records Review your records via [employer]   or contact Medical Records [phone]     You may receive a telephone call within two days of discharge. This call is to make certain you understand your discharge instructions and have the opportunity to have any questions answered. You can also easily access your medical information, test results and upcoming appointments via the [employer] free online health management tool. You can learn more and sign up at [employer]. For assistance setting up your [employer] account, please call [phone].    Once again, we want to ensure your discharge home is safe and that you have a clear understanding of any next steps in your care. If you have any questions or concerns, please do not hesitate to contact us, we are here for you. Thank you for choosing [facility] for your healthcare needs.    Sincerely,    Your [facility] Healthcare Team

## 2017-04-28 VITALS
RESPIRATION RATE: 18 BRPM | SYSTOLIC BLOOD PRESSURE: 149 MMHG | OXYGEN SATURATION: 96 % | HEIGHT: 71 IN | HEART RATE: 75 BPM | WEIGHT: 153 LBS | DIASTOLIC BLOOD PRESSURE: 80 MMHG | TEMPERATURE: 97.9 F | BODY MASS INDEX: 21.42 KG/M2

## 2017-04-28 PROCEDURE — 96361 HYDRATE IV INFUSION ADD-ON: CPT

## 2017-04-28 PROCEDURE — 96375 TX/PRO/DX INJ NEW DRUG ADDON: CPT

## 2017-04-28 PROCEDURE — 700111 HCHG RX REV CODE 636 W/ 250 OVERRIDE (IP): Performed by: EMERGENCY MEDICINE

## 2017-04-28 PROCEDURE — 700105 HCHG RX REV CODE 258: Performed by: EMERGENCY MEDICINE

## 2017-04-28 PROCEDURE — 96374 THER/PROPH/DIAG INJ IV PUSH: CPT

## 2017-04-28 RX ORDER — ONDANSETRON 2 MG/ML
4 INJECTION INTRAMUSCULAR; INTRAVENOUS ONCE
Status: COMPLETED | OUTPATIENT
Start: 2017-04-28 | End: 2017-04-28

## 2017-04-28 RX ORDER — SODIUM CHLORIDE 9 MG/ML
1000 INJECTION, SOLUTION INTRAVENOUS ONCE
Status: COMPLETED | OUTPATIENT
Start: 2017-04-28 | End: 2017-04-28

## 2017-04-28 RX ORDER — KETOROLAC TROMETHAMINE 30 MG/ML
30 INJECTION, SOLUTION INTRAMUSCULAR; INTRAVENOUS ONCE
Status: COMPLETED | OUTPATIENT
Start: 2017-04-28 | End: 2017-04-28

## 2017-04-28 RX ADMIN — ONDANSETRON 4 MG: 2 INJECTION INTRAMUSCULAR; INTRAVENOUS at 01:21

## 2017-04-28 RX ADMIN — SODIUM CHLORIDE 1000 ML: 9 INJECTION, SOLUTION INTRAVENOUS at 00:42

## 2017-04-28 RX ADMIN — KETOROLAC TROMETHAMINE 30 MG: 30 INJECTION, SOLUTION INTRAMUSCULAR at 02:09

## 2017-04-28 ASSESSMENT — PAIN SCALES - GENERAL: PAINLEVEL_OUTOF10: 5

## 2017-04-28 NOTE — ED NOTES
Pt resting quietly with eyes closed.  Pt verbalizes no complaints at this time.  Pt treated with toradol.  VS stable.

## 2017-04-28 NOTE — ED NOTES
"Chief Complaint   Patient presents with   • Chest Pain     Pt reports pain all over his chest x1 week. Pt initially reported heartburn to EMS and now denies.     /80 mmHg  Pulse 51  Temp(Src) 36.6 °C (97.9 °F)  Resp 14  Ht 1.803 m (5' 10.98\")  Wt 69.4 kg (153 lb)  BMI 21.35 kg/m2  SpO2 100%    Pt brought in by [facility], pt discharged from [facility] and walked across the street to call [facility]. Pt reports drinking 1 pint of alcohol a day, denies any drug use. Pt moderately drowsy, having to be woken up multiple times during assessment and requesting to sleep. Placed in room [address]. Complaints and vitals as above. Pt on monitors, all alarms audible. Chart flagged for ERP to see.  "

## 2017-04-28 NOTE — ED NOTES
Pt awake and dressed.  IV removed.  Pt education completed.  Pt verbalizes understanding and denies questions.

## 2017-04-28 NOTE — ED NOTES
"Pt sleeping on guconrad, when asked what about his pain pt states \"burning like on the skin all over chest when I move.\"  "

## 2017-04-28 NOTE — ED PROVIDER NOTES
"ED Provider Note    Scribed for Usha Perez D.O. by Koki Sharpe. 4/27/2017, 10:51 PM.    Primary care provider: Pcp Pt States None  Means of arrival: EMS  History obtained from: Patient  History limited by: None    CHIEF COMPLAINT  Chief Complaint   Patient presents with   • Chest Pain     Pt reports pain all over his chest x1 week. Pt initially reported heartburn to EMS and now denies.     HPI  Justice Ortega is a 31 y.o. male who presents to the Emergency Department for generalized pain onset one week ago. Patient states \"pain all over my body\" at this time. He reports associated chest pain located to center of chest. Denies chest pain radiates. He states chest pain is exacerbated by movement. Denies taking Advil for pain. Patient reports subjective fever. He states vomiting once today. Denies diarrhea or dysuria. Denies headache or vision changes. Denies coughing, wheezing, or rhinorrhea. He states slight congestion. Patient reports history of suicide attempt one year ago. Denies suicidal or homicidal ideation at this time. Denies alcohol or drug use. Patient denies taking drugs today. Denies daily use of medication. Denies hypertension, diabetes, hyperlipemia.     Patient was discharged from [facility] this afternoon and called EMS after discharge. Per nurse, patient drinks 'several beers daily'.       REVIEW OF SYSTEMS  See HPI for further details.     PAST MEDICAL HISTORY   has a past medical history of Psychiatric disorder.    SURGICAL HISTORY   has past surgical history that includes neck exploration (Left, 8/14/2015) and wound closure general (Left, 8/14/2015).    SOCIAL HISTORY  Social History   Substance Use Topics   • Smoking status: Current Every Day Smoker -- 1.00 packs/day     Types: Cigarettes   • Alcohol Use: Yes      Comment: several beers daily.      History   Drug Use No     Comment: Denies; Hx of heroin, meth     FAMILY HISTORY  Patient does not reports pertinent family history.     CURRENT " "MEDICATIONS  Reviewed.  See Encounter Summary.     ALLERGIES  No Known Allergies    PHYSICAL EXAM  VITAL SIGNS: /80 mmHg  Pulse 71  Temp(Src) 36.6 °C (97.9 °F)  Resp 15  Ht 1.803 m (5' 10.98\")  Wt 69.4 kg (153 lb)  BMI 21.35 kg/m2  SpO2 100%  Constitutional: Alert and in no apparent distress.  HENT: Normocephalic atraumatic. Bilateral external ears normal. Nose normal. Mucous membranes are moist.  Eyes: Pupils are pinpoint and reactive. Conjunctiva normal. Non-icteric sclera.   Neck: Normal range of motion without tenderness. Supple. No meningeal signs.  Cardiovascular: Regular rate and rhythm. No murmurs, gallops or rubs.  Thorax & Lungs: Breath sounds are clear to auscultation bilaterally. No wheezing, rhonchi or rales. The patient does have reproducible chest wall pain on the left upper chest.  Abdomen: Soft, nontender and nondistended. No peritoneal signs noted.  Skin: Warm and dry. No rashes are noted. A well-healed surgical scar is noted at the base of his neck on the left.  Back: No bony tenderness, No CVA tenderness.   Extremities: 2+ peripheral pulses. Cap refill is less than 2 seconds. No edema, cyanosis, or clubbing.  Musculoskeletal: Good range of motion in all major joints. No tenderness to palpation or major deformities noted.   Neurologic: Patient is sleepy but arousable. Alert and oriented ×3. The patient moves all 4 extremities and follows commands.  Psychiatric: Affect is normal. Judgment appears to be intact. He denies suicidal or homicidal ideations.     DIAGNOSTIC STUDIES / PROCEDURES     EKG  EKG was performed at 22:57 shows normal sinus rhythm with heart rate of 71. [place] interval is 136. QT/QTc are 408/444. Axis appears normal. No acute ST elevations or depressions are noted. Early repolarization is noted. Previous August 2015 this EKG appears unchanged. Impression:  Nonspecific EKG.      COURSE & MEDICAL DECISION MAKING  Pertinent Labs & Imaging studies reviewed. (See chart for " details)    10:51 PM - Patient seen and examined at bedside. Patient's pupils are pin point and reactive and this time. He denies drug use today. Patient presents with chest pain onset one week ago, Patient will be treated with NS infusion 1,000 mL. Ordered EKG to evaluate his symptoms. I ordered for patient's discharge notes from [facility] today.    1:24 AM - Nurse reports patient had one episode of emesis. Patient will be treated with Zofran 4 mg at this time.     Decision Making:  This is a 31 y.o. year old male who presents with general myalgias and chest pain. On initial evaluation, the patient was found to be sleeping and in no acute distress. His vital signs were stable. He was noted to have pinpoint pupils but was responsive. The patient had previously been at [place] and called EMS from outside [place] [facility] immediately after he was discharged. Records from [facility] were obtained and reviewed. Patient did have a chest x-ray. I reviewed the radiology report which was read as no acute cardiopulmonary findings. Additionally. A CBC was performed that showed a normal white count, normal H&H, normal platelets. His otherwise unremarkable. Electrolytes were performed and revealed a sodium of 131 BUN and creatinine were within normal limits. LFTs were normal. Troponin was less than 0.015. I did not think he needed a 2nd troponin as his pain and been going on for a week. I have low clinical suspicion for ACS given the duration of his symptoms, is unremarkable EKG, and his negative troponin from earlier today. Additionally, his GE score is 0. I did also review the patient's medical records here which confirmed the patient has a significant past medical history for drug abuse and alcohol abuse. Clinically, he was sober at this time and would answer questions appropriately. The patient was hydrated with normal saline and given Toradol for pain. His symptoms did improve and he was able to  tolerate by mouth here in the emergency department. He is stable for discharge at this time and will follow up in the hospitals clinic. He'll return immediately if any worsening signs or symptoms.    The patient has atypical chest pain as the patient's chest pain is not suggestive of pulmonary embolus, cardiac ischemia, aortic dissection, or other serious etiology. Given the extremely low risk of these diagnoses further testing and evaluation for these possibilities does not appear to be indicated at this time. The patient has been instructed to return if the symptoms worsen or change in any way.    DISPOSITION:  Patient will be discharged home in good condition.    Discharge Medications:  New Prescriptions    No medications on file     The patient was discharged home (see d/c instructions) and told to return immediately for any signs or symptoms listed, or any worsening at all.  The patient verbally agreed to the discharge precautions and follow-up plan which is documented in EPIC.    FINAL IMPRESSION  1. Myalgia    2. Chest pain, unspecified type          I, Koki Sharpe (Brenda), am scribing for, and in the presence of, Usha Perez D.O..    Electronically signed by: Koki Sharpe ([place]lie), 4/27/2017    I, Usha Perez D.O. personally performed the services described in this documentation, as scribed by Koki Sharpe in my presence, and it is both accurate and complete.    The note accurately reflects work and decisions made by me.  Usha Perez  4/28/2017  2:50 AM

## 2017-04-28 NOTE — DISCHARGE INSTRUCTIONS
Chest Wall Pain  Chest wall pain is pain in or around the bones and muscles of your chest. It may take up to 6 weeks to get better. It may take longer if you must stay physically active in your work and activities.   CAUSES   Chest wall pain may happen on its own. However, it may be caused by:  · A viral illness like the flu.  · Injury.  · Coughing.  · Exercise.  · Arthritis.  · Fibromyalgia.  · Shingles.  HOME CARE INSTRUCTIONS   · Avoid overtiring physical activity. Try not to strain or perform activities that cause pain. This includes any activities using your chest or your abdominal and side muscles, especially if heavy weights are used.  · Put ice on the sore area.  ¨ Put ice in a plastic bag.  ¨ Place a towel between your skin and the bag.  ¨ Leave the ice on for 15-20 minutes per hour while awake for the first 2 days.  · Only take over-the-counter or prescription medicines for pain, discomfort, or fever as directed by your caregiver.  SEEK IMMEDIATE MEDICAL CARE IF:   · Your pain increases, or you are very uncomfortable.  · You have a fever.  · Your chest pain becomes worse.  · You have new, unexplained symptoms.  · You have nausea or vomiting.  · You feel sweaty or lightheaded.  · You have a cough with phlegm (sputum), or you cough up blood.  MAKE SURE YOU:   · Understand these instructions.  · Will watch your condition.  · Will get help right away if you are not doing well or get worse.     This information is not intended to replace advice given to you by your health care provider. Make sure you discuss any questions you have with your health care provider.     Document Released: 12/18/2006 Document Revised: 03/11/2013 Document Reviewed: 03/14/2016  [employer] Interactive Patient Education ©2016 [employer]    Chest Pain, Nonspecific  It is often hard to give a specific diagnosis for the cause of chest pain. There is always a chance that your pain could be related to something serious, like a heart attack  or a blood clot in the lungs. You need to follow up with your caregiver for further evaluation. More lab tests or other studies such as X-rays, electrocardiography, stress testing, or cardiac imaging may be needed to find the cause of your pain.  Most of the time, nonspecific chest pain improves within 2 to 3 days with rest and mild pain medicine. For the next few days, avoid physical exertion or activities that bring on pain. Do not smoke. Avoid drinking alcohol. Call your caregiver for routine follow-up as advised.   SEEK IMMEDIATE MEDICAL CARE IF:  · You develop increased chest pain or pain that radiates to the arm, neck, jaw, back, or abdomen.   · You develop shortness of breath, increased coughing, or you start coughing up blood.   · You have severe back or abdominal pain, nausea, or vomiting.   · You develop severe weakness, fainting, fever, or chills.   Document Released: 12/18/2006 Document Revised: 03/11/2013 Document Reviewed: 06/06/2008  [employer]® Patient Information ©2013 [employer].  Muscle Pain, Adult  Muscle pain (myalgia) may be caused by many things, including:  · Overuse or muscle strain, especially if you are not in shape. This is the most common cause of muscle pain.  · Injury.  · Bruises.  · Viruses, such as the flu.  · Infectious diseases.  · Fibromyalgia, which is a chronic condition that causes muscle tenderness, fatigue, and headache.  · Autoimmune diseases, including lupus.  · Certain drugs, including ACE inhibitors and statins.  Muscle pain may be mild or severe. In most cases, the pain lasts only a short time and goes away without treatment. To diagnose the cause of your muscle pain, your health care provider will take your medical history. This means he or she will ask you when your muscle pain began and what has been happening. If you have not had muscle pain for very long, your health care provider may want to wait before doing much testing. If your muscle pain has lasted a long  time, your health care provider may want to run tests right away. If your health care provider thinks your muscle pain may be caused by illness, you may need to have additional tests to rule out certain conditions.   Treatment for muscle pain depends on the cause. Home care is often enough to relieve muscle pain. Your health care provider may also prescribe anti-inflammatory medicine.  HOME CARE INSTRUCTIONS  Watch your condition for any changes. The following actions may help to lessen any discomfort you are feeling:  · Only take over-the-counter or prescription medicines as directed by your health care provider.  · Apply ice to the sore muscle:  ¨ Put ice in a plastic bag.  ¨ Place a towel between your skin and the bag.  ¨ Leave the ice on for 15-20 minutes, 3-4 times a day.  · You may alternate applying hot and cold packs to the muscle as directed by your health care provider.  · If overuse is causing your muscle pain, slow down your activities until the pain goes away.  ¨ Remember that it is normal to feel some muscle pain after starting a workout program. Muscles that have not been used often will be sore at first.  ¨ Do regular, gentle exercises if you are not usually active.  ¨ Warm up before exercising to lower your risk of muscle pain.  · Do not continue working out if the pain is very bad. Bad pain could mean you have injured a muscle.  SEEK MEDICAL CARE IF:  · Your muscle pain gets worse, and medicines do not help.  · You have muscle pain that lasts longer than 3 days.  · You have a rash or fever along with muscle pain.  · You have muscle pain after a tick bite.  · You have muscle pain while working out, even though you are in good physical condition.  · You have redness, soreness, or swelling along with muscle pain.  · You have muscle pain after starting a new medicine or changing the dose of a medicine.  SEEK IMMEDIATE MEDICAL CARE IF:  · You have trouble breathing.  · You have trouble  swallowing.  · You have muscle pain along with a stiff neck, fever, and vomiting.  · You have severe muscle weakness or cannot move part of your body.  MAKE SURE YOU:   · Understand these instructions.  · Will watch your condition.  · Will get help right away if you are not doing well or get worse.     This information is not intended to replace advice given to you by your health care provider. Make sure you discuss any questions you have with your health care provider.     Document Released: 11/09/2007 Document Revised: 01/08/2016 Document Reviewed: 10/14/2014  [employer] Interactive Patient Education ©2016 [employer]

## 2017-04-28 NOTE — ED NOTES
Received report from IVAN Iniguez.  Pt updated on POC, call light in place, gurney in low/locked position.

## 2017-10-24 ENCOUNTER — HOSPITAL ENCOUNTER (OUTPATIENT)
Facility: MEDICAL CENTER | Age: 32
End: 2017-10-25
Attending: EMERGENCY MEDICINE | Admitting: HOSPITALIST
Payer: MEDICAID

## 2017-10-24 ENCOUNTER — APPOINTMENT (OUTPATIENT)
Dept: RADIOLOGY | Facility: MEDICAL CENTER | Age: 32
End: 2017-10-24
Attending: EMERGENCY MEDICINE
Payer: MEDICAID

## 2017-10-24 ENCOUNTER — RESOLUTE PROFESSIONAL BILLING HOSPITAL PROF FEE (OUTPATIENT)
Dept: HOSPITALIST | Facility: MEDICAL CENTER | Age: 32
End: 2017-10-24
Payer: MEDICAID

## 2017-10-24 ENCOUNTER — APPOINTMENT (OUTPATIENT)
Dept: RADIOLOGY | Facility: MEDICAL CENTER | Age: 32
End: 2017-10-24
Attending: HOSPITALIST
Payer: MEDICAID

## 2017-10-24 DIAGNOSIS — R07.9 CHEST PAIN, UNSPECIFIED TYPE: ICD-10-CM

## 2017-10-24 PROBLEM — R94.31 ABNORMAL EKG: Status: ACTIVE | Noted: 2017-10-24

## 2017-10-24 LAB
ALBUMIN SERPL BCP-MCNC: 4.4 G/DL (ref 3.2–4.9)
ALBUMIN/GLOB SERPL: 1.5 G/DL
ALP SERPL-CCNC: 108 U/L (ref 30–99)
ALT SERPL-CCNC: 20 U/L (ref 2–50)
ANION GAP SERPL CALC-SCNC: 8 MMOL/L (ref 0–11.9)
APTT PPP: 27.6 SEC (ref 24.7–36)
AST SERPL-CCNC: 18 U/L (ref 12–45)
BASOPHILS # BLD AUTO: 0.6 % (ref 0–1.8)
BASOPHILS # BLD: 0.07 K/UL (ref 0–0.12)
BILIRUB SERPL-MCNC: 0.6 MG/DL (ref 0.1–1.5)
BNP SERPL-MCNC: 5 PG/ML (ref 0–100)
BUN SERPL-MCNC: 11 MG/DL (ref 8–22)
CALCIUM SERPL-MCNC: 10.1 MG/DL (ref 8.5–10.5)
CHLORIDE SERPL-SCNC: 102 MMOL/L (ref 96–112)
CO2 SERPL-SCNC: 25 MMOL/L (ref 20–33)
CREAT SERPL-MCNC: 0.88 MG/DL (ref 0.5–1.4)
EKG IMPRESSION: NORMAL
EOSINOPHIL # BLD AUTO: 0.06 K/UL (ref 0–0.51)
EOSINOPHIL NFR BLD: 0.5 % (ref 0–6.9)
ERYTHROCYTE [DISTWIDTH] IN BLOOD BY AUTOMATED COUNT: 40.2 FL (ref 35.9–50)
GFR SERPL CREATININE-BSD FRML MDRD: >60 ML/MIN/1.73 M 2
GLOBULIN SER CALC-MCNC: 2.9 G/DL (ref 1.9–3.5)
GLUCOSE SERPL-MCNC: 116 MG/DL (ref 65–99)
HCT VFR BLD AUTO: 49.9 % (ref 42–52)
HGB BLD-MCNC: 17.7 G/DL (ref 14–18)
IMM GRANULOCYTES # BLD AUTO: 0.06 K/UL (ref 0–0.11)
IMM GRANULOCYTES NFR BLD AUTO: 0.5 % (ref 0–0.9)
INR PPP: 0.94 (ref 0.87–1.13)
LIPASE SERPL-CCNC: 12 U/L (ref 11–82)
LYMPHOCYTES # BLD AUTO: 2.4 K/UL (ref 1–4.8)
LYMPHOCYTES NFR BLD: 20.1 % (ref 22–41)
MCH RBC QN AUTO: 32.8 PG (ref 27–33)
MCHC RBC AUTO-ENTMCNC: 35.5 G/DL (ref 33.7–35.3)
MCV RBC AUTO: 92.4 FL (ref 81.4–97.8)
MONOCYTES # BLD AUTO: 0.97 K/UL (ref 0–0.85)
MONOCYTES NFR BLD AUTO: 8.1 % (ref 0–13.4)
NEUTROPHILS # BLD AUTO: 8.39 K/UL (ref 1.82–7.42)
NEUTROPHILS NFR BLD: 70.2 % (ref 44–72)
NRBC # BLD AUTO: 0 K/UL
NRBC BLD AUTO-RTO: 0 /100 WBC
PHOSPHATE SERPL-MCNC: 3.2 MG/DL (ref 2.5–4.5)
PLATELET # BLD AUTO: 324 K/UL (ref 164–446)
PMV BLD AUTO: 9.6 FL (ref 9–12.9)
POTASSIUM SERPL-SCNC: 3.9 MMOL/L (ref 3.6–5.5)
PROT SERPL-MCNC: 7.3 G/DL (ref 6–8.2)
PROTHROMBIN TIME: 12.9 SEC (ref 12–14.6)
RBC # BLD AUTO: 5.4 M/UL (ref 4.7–6.1)
SODIUM SERPL-SCNC: 135 MMOL/L (ref 135–145)
TROPONIN I SERPL-MCNC: <0.01 NG/ML (ref 0–0.04)
TROPONIN I SERPL-MCNC: <0.01 NG/ML (ref 0–0.04)
WBC # BLD AUTO: 12 K/UL (ref 4.8–10.8)

## 2017-10-24 PROCEDURE — 700102 HCHG RX REV CODE 250 W/ 637 OVERRIDE(OP): Performed by: EMERGENCY MEDICINE

## 2017-10-24 PROCEDURE — 700117 HCHG RX CONTRAST REV CODE 255: Performed by: HOSPITALIST

## 2017-10-24 PROCEDURE — 93010 ELECTROCARDIOGRAM REPORT: CPT | Performed by: INTERNAL MEDICINE

## 2017-10-24 PROCEDURE — 700102 HCHG RX REV CODE 250 W/ 637 OVERRIDE(OP): Performed by: HOSPITALIST

## 2017-10-24 PROCEDURE — 81003 URINALYSIS AUTO W/O SCOPE: CPT

## 2017-10-24 PROCEDURE — 700105 HCHG RX REV CODE 258: Performed by: HOSPITALIST

## 2017-10-24 PROCEDURE — 36415 COLL VENOUS BLD VENIPUNCTURE: CPT

## 2017-10-24 PROCEDURE — 85730 THROMBOPLASTIN TIME PARTIAL: CPT

## 2017-10-24 PROCEDURE — 99285 EMERGENCY DEPT VISIT HI MDM: CPT

## 2017-10-24 PROCEDURE — 99220 PR INITIAL OBSERVATION CARE,LEVL III: CPT | Performed by: HOSPITALIST

## 2017-10-24 PROCEDURE — 93005 ELECTROCARDIOGRAM TRACING: CPT

## 2017-10-24 PROCEDURE — 84484 ASSAY OF TROPONIN QUANT: CPT

## 2017-10-24 PROCEDURE — 700111 HCHG RX REV CODE 636 W/ 250 OVERRIDE (IP): Performed by: HOSPITALIST

## 2017-10-24 PROCEDURE — A9270 NON-COVERED ITEM OR SERVICE: HCPCS | Performed by: EMERGENCY MEDICINE

## 2017-10-24 PROCEDURE — 74177 CT ABD & PELVIS W/CONTRAST: CPT

## 2017-10-24 PROCEDURE — 96374 THER/PROPH/DIAG INJ IV PUSH: CPT

## 2017-10-24 PROCEDURE — 96375 TX/PRO/DX INJ NEW DRUG ADDON: CPT

## 2017-10-24 PROCEDURE — G0378 HOSPITAL OBSERVATION PER HR: HCPCS

## 2017-10-24 PROCEDURE — 80307 DRUG TEST PRSMV CHEM ANLYZR: CPT

## 2017-10-24 PROCEDURE — 83880 ASSAY OF NATRIURETIC PEPTIDE: CPT

## 2017-10-24 PROCEDURE — 83690 ASSAY OF LIPASE: CPT

## 2017-10-24 PROCEDURE — 80053 COMPREHEN METABOLIC PANEL: CPT

## 2017-10-24 PROCEDURE — A9270 NON-COVERED ITEM OR SERVICE: HCPCS | Performed by: HOSPITALIST

## 2017-10-24 PROCEDURE — 93010 ELECTROCARDIOGRAM REPORT: CPT | Mod: 77 | Performed by: INTERNAL MEDICINE

## 2017-10-24 PROCEDURE — 93005 ELECTROCARDIOGRAM TRACING: CPT | Performed by: HOSPITALIST

## 2017-10-24 PROCEDURE — 85610 PROTHROMBIN TIME: CPT

## 2017-10-24 PROCEDURE — 93005 ELECTROCARDIOGRAM TRACING: CPT | Performed by: EMERGENCY MEDICINE

## 2017-10-24 PROCEDURE — 84100 ASSAY OF PHOSPHORUS: CPT

## 2017-10-24 PROCEDURE — 85025 COMPLETE CBC W/AUTO DIFF WBC: CPT

## 2017-10-24 PROCEDURE — 71010 DX-CHEST-LIMITED (1 VIEW): CPT

## 2017-10-24 RX ORDER — LORAZEPAM 1 MG/1
3 TABLET ORAL
Status: DISCONTINUED | OUTPATIENT
Start: 2017-10-24 | End: 2017-10-25 | Stop reason: HOSPADM

## 2017-10-24 RX ORDER — AMOXICILLIN 250 MG
2 CAPSULE ORAL 2 TIMES DAILY
Status: DISCONTINUED | OUTPATIENT
Start: 2017-10-24 | End: 2017-10-25 | Stop reason: HOSPADM

## 2017-10-24 RX ORDER — LORAZEPAM 2 MG/ML
1 INJECTION INTRAMUSCULAR
Status: DISCONTINUED | OUTPATIENT
Start: 2017-10-24 | End: 2017-10-25 | Stop reason: HOSPADM

## 2017-10-24 RX ORDER — ASPIRIN 325 MG
325 TABLET ORAL DAILY
Status: DISCONTINUED | OUTPATIENT
Start: 2017-10-25 | End: 2017-10-25 | Stop reason: HOSPADM

## 2017-10-24 RX ORDER — LORAZEPAM 1 MG/1
0.5 TABLET ORAL EVERY 4 HOURS PRN
Status: DISCONTINUED | OUTPATIENT
Start: 2017-10-24 | End: 2017-10-25 | Stop reason: HOSPADM

## 2017-10-24 RX ORDER — LORAZEPAM 2 MG/ML
2 INJECTION INTRAMUSCULAR
Status: DISCONTINUED | OUTPATIENT
Start: 2017-10-24 | End: 2017-10-25 | Stop reason: HOSPADM

## 2017-10-24 RX ORDER — DEXTROSE AND SODIUM CHLORIDE 5; .45 G/100ML; G/100ML
INJECTION, SOLUTION INTRAVENOUS CONTINUOUS
Status: DISCONTINUED | OUTPATIENT
Start: 2017-10-24 | End: 2017-10-25 | Stop reason: HOSPADM

## 2017-10-24 RX ORDER — LORAZEPAM 1 MG/1
1 TABLET ORAL EVERY 4 HOURS PRN
Status: DISCONTINUED | OUTPATIENT
Start: 2017-10-24 | End: 2017-10-25 | Stop reason: HOSPADM

## 2017-10-24 RX ORDER — PROMETHAZINE HYDROCHLORIDE 25 MG/1
12.5-25 TABLET ORAL EVERY 4 HOURS PRN
Status: DISCONTINUED | OUTPATIENT
Start: 2017-10-24 | End: 2017-10-25 | Stop reason: HOSPADM

## 2017-10-24 RX ORDER — LORAZEPAM 1 MG/1
2 TABLET ORAL
Status: DISCONTINUED | OUTPATIENT
Start: 2017-10-24 | End: 2017-10-25 | Stop reason: HOSPADM

## 2017-10-24 RX ORDER — TEMAZEPAM 15 MG/1
15 CAPSULE ORAL
Status: DISCONTINUED | OUTPATIENT
Start: 2017-10-24 | End: 2017-10-25 | Stop reason: HOSPADM

## 2017-10-24 RX ORDER — ACETAMINOPHEN 325 MG/1
650 TABLET ORAL EVERY 6 HOURS PRN
Status: DISCONTINUED | OUTPATIENT
Start: 2017-10-24 | End: 2017-10-25 | Stop reason: HOSPADM

## 2017-10-24 RX ORDER — ONDANSETRON 4 MG/1
4 TABLET, ORALLY DISINTEGRATING ORAL EVERY 4 HOURS PRN
Status: DISCONTINUED | OUTPATIENT
Start: 2017-10-24 | End: 2017-10-25 | Stop reason: HOSPADM

## 2017-10-24 RX ORDER — MORPHINE SULFATE 4 MG/ML
2-4 INJECTION, SOLUTION INTRAMUSCULAR; INTRAVENOUS
Status: DISCONTINUED | OUTPATIENT
Start: 2017-10-24 | End: 2017-10-25 | Stop reason: HOSPADM

## 2017-10-24 RX ORDER — ONDANSETRON 2 MG/ML
4 INJECTION INTRAMUSCULAR; INTRAVENOUS EVERY 4 HOURS PRN
Status: DISCONTINUED | OUTPATIENT
Start: 2017-10-24 | End: 2017-10-25 | Stop reason: HOSPADM

## 2017-10-24 RX ORDER — POLYETHYLENE GLYCOL 3350 17 G/17G
1 POWDER, FOR SOLUTION ORAL
Status: DISCONTINUED | OUTPATIENT
Start: 2017-10-24 | End: 2017-10-25 | Stop reason: HOSPADM

## 2017-10-24 RX ORDER — ASPIRIN 81 MG/1
324 TABLET, CHEWABLE ORAL ONCE
Status: COMPLETED | OUTPATIENT
Start: 2017-10-24 | End: 2017-10-24

## 2017-10-24 RX ORDER — ASPIRIN 300 MG/1
300 SUPPOSITORY RECTAL DAILY
Status: DISCONTINUED | OUTPATIENT
Start: 2017-10-25 | End: 2017-10-25 | Stop reason: HOSPADM

## 2017-10-24 RX ORDER — OMEPRAZOLE 20 MG/1
20 CAPSULE, DELAYED RELEASE ORAL DAILY
Status: DISCONTINUED | OUTPATIENT
Start: 2017-10-24 | End: 2017-10-25 | Stop reason: HOSPADM

## 2017-10-24 RX ORDER — LORAZEPAM 1 MG/1
4 TABLET ORAL
Status: DISCONTINUED | OUTPATIENT
Start: 2017-10-24 | End: 2017-10-25 | Stop reason: HOSPADM

## 2017-10-24 RX ORDER — PROMETHAZINE HYDROCHLORIDE 12.5 MG/1
12.5-25 SUPPOSITORY RECTAL EVERY 4 HOURS PRN
Status: DISCONTINUED | OUTPATIENT
Start: 2017-10-24 | End: 2017-10-25 | Stop reason: HOSPADM

## 2017-10-24 RX ORDER — BISACODYL 10 MG
10 SUPPOSITORY, RECTAL RECTAL
Status: DISCONTINUED | OUTPATIENT
Start: 2017-10-24 | End: 2017-10-25 | Stop reason: HOSPADM

## 2017-10-24 RX ORDER — LORAZEPAM 2 MG/ML
1.5 INJECTION INTRAMUSCULAR
Status: DISCONTINUED | OUTPATIENT
Start: 2017-10-24 | End: 2017-10-25 | Stop reason: HOSPADM

## 2017-10-24 RX ORDER — ASPIRIN 81 MG/1
324 TABLET, CHEWABLE ORAL DAILY
Status: DISCONTINUED | OUTPATIENT
Start: 2017-10-25 | End: 2017-10-25 | Stop reason: HOSPADM

## 2017-10-24 RX ORDER — LORAZEPAM 2 MG/ML
0.5 INJECTION INTRAMUSCULAR EVERY 4 HOURS PRN
Status: DISCONTINUED | OUTPATIENT
Start: 2017-10-24 | End: 2017-10-25 | Stop reason: HOSPADM

## 2017-10-24 RX ADMIN — IOHEXOL 100 ML: 350 INJECTION, SOLUTION INTRAVENOUS at 20:15

## 2017-10-24 RX ADMIN — ASPIRIN 324 MG: 81 TABLET, CHEWABLE ORAL at 13:56

## 2017-10-24 RX ADMIN — OMEPRAZOLE 20 MG: 20 CAPSULE, DELAYED RELEASE ORAL at 16:05

## 2017-10-24 RX ADMIN — MORPHINE SULFATE 4 MG: 4 INJECTION INTRAVENOUS at 16:05

## 2017-10-24 RX ADMIN — LIDOCAINE HYDROCHLORIDE 30 ML: 20 SOLUTION OROPHARYNGEAL at 16:05

## 2017-10-24 RX ADMIN — STANDARDIZED SENNA CONCENTRATE AND DOCUSATE SODIUM 2 TABLET: 8.6; 5 TABLET, FILM COATED ORAL at 20:41

## 2017-10-24 RX ADMIN — LORAZEPAM 2 MG: 1 TABLET ORAL at 17:06

## 2017-10-24 RX ADMIN — DEXTROSE AND SODIUM CHLORIDE: 5; 450 INJECTION, SOLUTION INTRAVENOUS at 17:06

## 2017-10-24 RX ADMIN — LORAZEPAM 0.5 MG: 2 INJECTION INTRAMUSCULAR at 20:44

## 2017-10-24 ASSESSMENT — LIFESTYLE VARIABLES
TOTAL SCORE: 1
AUDITORY DISTURBANCES: VERY MILD HARSHNESS OR ABILITY TO FRIGHTEN
ORIENTATION AND CLOUDING OF SENSORIUM: ORIENTED AND CAN DO SERIAL ADDITIONS
HOW MANY TIMES IN THE PAST YEAR HAVE YOU HAD 5 OR MORE DRINKS IN A DAY: 7
AGITATION: NORMAL ACTIVITY
ON A TYPICAL DAY WHEN YOU DRINK ALCOHOL HOW MANY DRINKS DO YOU HAVE: 7
HAVE YOU EVER FELT YOU SHOULD CUT DOWN ON YOUR DRINKING: NO
VISUAL DISTURBANCES: NOT PRESENT
AGITATION: NORMAL ACTIVITY
ALCOHOL_USE: YES
TOTAL SCORE: 1
NAUSEA AND VOMITING: MILD NAUSEA WITH NO VOMITING
EVER_SMOKED: YES
ANXIETY: MILDLY ANXIOUS
AUDITORY DISTURBANCES: NOT PRESENT
HEADACHE, FULLNESS IN HEAD: MILD
ORIENTATION AND CLOUDING OF SENSORIUM: ORIENTED AND CAN DO SERIAL ADDITIONS
HEADACHE, FULLNESS IN HEAD: VERY MILD
PAROXYSMAL SWEATS: *
NAUSEA AND VOMITING: INTERMITTENT NAUSEA WITH DRY HEAVES
HAVE PEOPLE ANNOYED YOU BY CRITICIZING YOUR DRINKING: NO
TREMOR: *
TOTAL SCORE: 1
PAROXYSMAL SWEATS: BEADS OF SWEAT OBVIOUS ON FOREHEAD
TREMOR: TREMOR NOT VISIBLE BUT CAN BE FELT, FINGERTIP TO FINGERTIP
EVER FELT BAD OR GUILTY ABOUT YOUR DRINKING: NO
TOTAL SCORE: 9
TOTAL SCORE: 11
ANXIETY: MILDLY ANXIOUS
AVERAGE NUMBER OF DAYS PER WEEK YOU HAVE A DRINK CONTAINING ALCOHOL: 7
EVER HAD A DRINK FIRST THING IN THE MORNING TO STEADY YOUR NERVES TO GET RID OF A HANGOVER: YES
CONSUMPTION TOTAL: POSITIVE
VISUAL DISTURBANCES: NOT PRESENT

## 2017-10-24 ASSESSMENT — PATIENT HEALTH QUESTIONNAIRE - PHQ9
2. FEELING DOWN, DEPRESSED, IRRITABLE, OR HOPELESS: NOT AT ALL
1. LITTLE INTEREST OR PLEASURE IN DOING THINGS: NOT AT ALL
SUM OF ALL RESPONSES TO PHQ9 QUESTIONS 1 AND 2: 0
SUM OF ALL RESPONSES TO PHQ QUESTIONS 1-9: 0

## 2017-10-24 ASSESSMENT — PAIN SCALES - GENERAL
PAINLEVEL_OUTOF10: 5
PAINLEVEL_OUTOF10: 0

## 2017-10-24 ASSESSMENT — COPD QUESTIONNAIRES
DO YOU EVER COUGH UP ANY MUCUS OR PHLEGM?: NO/ONLY WITH OCCASIONAL COLDS OR INFECTIONS
HAVE YOU SMOKED AT LEAST 100 CIGARETTES IN YOUR ENTIRE LIFE: NO/DON'T KNOW
COPD SCREENING SCORE: 1
DURING THE PAST 4 WEEKS HOW MUCH DID YOU FEEL SHORT OF BREATH: SOME OF THE TIME

## 2017-10-24 NOTE — PROGRESS NOTES
Report received, assumed patient care.  Pt A&OX4.  Assessment completed.  Call light within reach, personal belongings available, bed in lowest position, pt calling for assistance.  Pt reports pain, see MAR.  +n/v.  Communication board updated, POC discussed.  Monitors applied, VSS.  No additional needs at this time.

## 2017-10-24 NOTE — H&P
CHIEF COMPLAINT:  Chest and upper abdominal pain.    HISTORY OF PRESENT ILLNESS:  This is a 31-year-old male who presented for   evaluation of epigastric pain and lower chest pain to the emergency room   earlier today.  His initial EKG was concerning for possible STEMI, so   cardiology was consulted.  They evaluated and felt this did not represent a   STEMI and recommended hospitalist admission for further evaluation.    Patient's history and review of systems is somewhat limited, as he is   reluctant to participate with full questioning.  He reports to me pain in his   central chest, which he describes as a burning sensation and much worse   burning in his epigastric region.  He states these symptoms are better when he   eats or drinks, they are not made worse by anything that he is aware of.    Symptoms have been going on for about a week, and during that time, seemed to   be getting worse.  He denies any change in his symptoms when he is up and   active.    Patient does report to me that he has had problems with ulcers in the past and   states the pain was somewhat similar to what he is having now, although not   as intense.    REVIEW OF SYSTEMS:  Positive as noted, otherwise limited for reasons as noted.    PREVIOUS MEDICAL HISTORY:  1.  Distant history of peptic ulcer disease.  2.  History of methamphetamine use.  3.  History of alcohol abuse.  4.  Current tobacco use.    MEDICATIONS:  Patient is currently taking no prescribed medications.    ALLERGIES:  No known drug allergies.    SOCIAL HISTORY:  Positive as noted above.    SURGICAL HISTORY:  Neck exploration.    FAMILY HISTORY:  Patient tells me his family is well and denies any   significant coronary artery disease or stroke or peripheral arterial disease.    PHYSICAL EXAMINATION:  VITAL SIGNS:  Temperature 36.4, heart rate 63, respiratory rate 18, /85,   sating 98% on room air.  GENERAL:  The patient is awake, alert.  He is in no acute  distress.  HEENT:  Head is normocephalic and atraumatic.  Mucous membranes are slightly   dry.  OP is clear.  NECK:  Trachea is in the midline.  There is no JVD or bruits.  RESPIRATORY:  Clear to auscultation bilaterally.  CARDIAC:  Regular rate and rhythm.  No murmurs, rubs, or clicks.  ABDOMEN:  Soft.  No guarding, rebound, hepatosplenomegaly, or masses.  Tender   to palpation in the epigastrium, which reproduces his symptoms.  EXTREMITIES:  No clubbing, cyanosis, or edema.  Peripheral pulses are   maintained.  Calves are nontender to palpation.  No palpable cords.  SKIN:  Warm and dry.  No rashes are appreciated.  NEUROLOGIC:  Alert and nonfocal.  PSYCHIATRIC:  Slightly disheveled.  Mood and affect are appropriate.    LABORATORY DATA:  White count 12.0, hemoglobin 17.7, platelet count 324.    Sodium 135, potassium 3.9, BUN 11, creatinine 0.88.  LFTs are unremarkable.    Lipase 12, troponin I less than 0.01.  INR 0.94.    IMAGING STUDIES:  1.  Portable chest x-ray demonstrates no acute findings.  2.  EKG is reviewed, demonstrates a sinus rhythm with normal rate.  There is   some J-point elevation appreciated in V3; however, none in V2 or V4.  No other   evidence of ischemia.  Intervals are benign.  Impression:  Unremarkable EKG.    ASSESSMENT AND PLAN:  This is a 31-year-old gentleman with problem list as   follows:  1.  Chest and epigastric pain:  This would appear to be more of an abdominal   source than chest.  We will admit the patient and rule him out with serial   enzymes and a stress imaging study, but I do not think further imaging in this   regard is likely to be of benefit for the patient.  As regards to the   epigastric pain, he is tender in the epigastrium and has a history of peptic   ulcer disease in the past.  His symptoms improving with food would speak to   possibly gastrointestinal source as well.  We will place him on a proton pump   inhibitor, try gastrointestinal cocktail, check a CT of his  abdomen.  We will   repeat CMP and lipase tomorrow and follow serial abdominal exams.  2.  History of methamphetamine abuse:  Patient obviously needs to quit.  We   will encourage him to do so.  3.  History of alcohol abuse:  Encouraged cessation.  Watch for signs of   withdrawal.  4.  Tobacco abuse:  Again, encouraged cessation, p.r.n. support.  5.  Poor social situation:  Patient is apparently homeless.  We will have   social work to see if we can a good place for him to go once he is discharged.       ____________________________________     DO BLANE Manzano / JANETT    DD:  10/24/2017 15:17:04  DT:  10/24/2017 16:11:39    D#:  [id number]  Job#:  [id number]

## 2017-10-24 NOTE — DISCHARGE PLANNING
Pt is homeless, uninsured, pt no forthcoming with information.  No needs assessed at this time.  Will continue to be available for future needs.

## 2017-10-24 NOTE — DISCHARGE PLANNING
SW responded to code STEMI. SW met with pt at bedside to inquire about NOK. Pt repeatedly denied NOK, and stated that he does not want to provide information. SW encouraged pt to follow up should he change his mind. SW to remain available.

## 2017-10-24 NOTE — CONSULTS
"Reason of Consult: \"STEMI \"    Cnsulting Physician: Dr. Patino, ERP    HPI:  31-year-old gentleman who presents as a \"STEMI\" activation. He has 1 week of intermittent chest pain worse with movement and palpation. EKG in the emergency department does not show evidence of ST elevation myocardial infarction or ischemic changes. He is an active smoker who drinks alcohol and appears impaired currently. He is also an active methamphetamine abuser. He has no other cardiac symptoms prior to this. In the emergency department he is hemodynamically and electrically stable with an EKG compatible with J-point elevation.       Past Medical History:   Diagnosis Date   • Psychiatric disorder        Social History     Social History   • Marital status: Single     Spouse name: N/A   • Number of children: N/A   • Years of education: N/A     Occupational History   • Not on file.     Social History Main Topics   • Smoking status: Current Every Day Smoker     Packs/day: 1.00     Types: Cigarettes   • Smokeless tobacco: Not on file   • Alcohol use Yes      Comment: 1 pint daily   • Drug use: No      Comment: Denies; Hx of heroin, meth   • Sexual activity: Not on file     Other Topics Concern   • Not on file     Social History Narrative    ** Merged History Encounter **         ** Merged History Encounter **         ** Merged History Encounter **         ** Merged History Encounter **         ** Merged History Encounter **            No current facility-administered medications on file prior to encounter.      No current outpatient prescriptions on file prior to encounter.       No current facility-administered medications for this encounter.        Last reviewed on 4/27/2017 11:26 PM by Geetha Del Toro R.N.    Review of patient's allergies indicates no known allergies.    No family history on file.    ROS: As per HPI all other systems reviewed and negative     Physical Exam   Blood pressure 112/85, pulse 90, temperature 36.6 °C (97.8 " "°F), resp. rate 16, height 1.803 m (5' 11\"), SpO2 100 %.    Constitutional:Thin. Difficulty attending to conversation. Appears well-developed.   HENT: Normocephalic and atraumatic. No scleral icterus.   Neck: No JVD present.   Cardiovascular: Normal rate. Exam reveals no gallop and no friction rub. No murmur heard.   Pulmonary/Chest: CTAB. The anterior chest is tender to light and deep palpation which the patient indicates reproduces his symptoms that precipitated his presentation.  Abdominal: S/NT/ND BS+   Musculoskeletal:  Pulses present. No atrophy. Strength normal.  Extremities: Exhibits no edema. No clubbing or cyanosis.   Skin: Skin is warm and dry.   Neuro: Non-focal, CN 2-12 intact grossly    No intake or output data in the 24 hours ending 10/24/17 1203                              EKG (10/24/2017 ):  I have personally reviewed the EKG this visit and discussed with the patient. It shows sinus rhythm. J-point elevation. NORMAL ECG for age.    Imaging reviewed    Impressions:  1. Musculoskeletal chest pain  2. Tobacco abuse  3. Polysubstance abuse    Recommendations:  Recommend completing his emergency department evaluation with a delta troponin and further evaluation for musculoskeletal chest pain. Recommend abstinence from alcohol drugs and tobacco. He is being planned for admission to observation, and I think this is reasonable. An echocardiogram could be considered especially given his polysubstance abuse to assess for any resultant LV dysfunction.    Thank you for this interesting consultation. It was my pleasure to see Justice Ortega today.    Xavi Angulo MD, FACC, [facility]  Division of Interventional Cardiology  [facility]    "

## 2017-10-24 NOTE — ED NOTES
EKG completed on patient, signed by ERP ( Sukumar)  Per ERP patient is a STEMI. Patient to Trauma bay

## 2017-10-25 VITALS
WEIGHT: 145.5 LBS | OXYGEN SATURATION: 100 % | DIASTOLIC BLOOD PRESSURE: 92 MMHG | HEIGHT: 71 IN | HEART RATE: 86 BPM | RESPIRATION RATE: 17 BRPM | TEMPERATURE: 97.7 F | BODY MASS INDEX: 20.37 KG/M2 | SYSTOLIC BLOOD PRESSURE: 141 MMHG

## 2017-10-25 LAB
ALBUMIN SERPL BCP-MCNC: 4.1 G/DL (ref 3.2–4.9)
ALBUMIN/GLOB SERPL: 1.5 G/DL
ALP SERPL-CCNC: 100 U/L (ref 30–99)
ALT SERPL-CCNC: 19 U/L (ref 2–50)
ANION GAP SERPL CALC-SCNC: 9 MMOL/L (ref 0–11.9)
APAP SERPL-MCNC: <10 UG/ML (ref 10–30)
APPEARANCE UR: CLEAR
AST SERPL-CCNC: 18 U/L (ref 12–45)
BASOPHILS # BLD AUTO: 0.3 % (ref 0–1.8)
BASOPHILS # BLD: 0.03 K/UL (ref 0–0.12)
BILIRUB SERPL-MCNC: 0.8 MG/DL (ref 0.1–1.5)
BILIRUB UR QL STRIP.AUTO: NEGATIVE
BUN SERPL-MCNC: 9 MG/DL (ref 8–22)
CALCIUM SERPL-MCNC: 9.6 MG/DL (ref 8.5–10.5)
CHLORIDE SERPL-SCNC: 100 MMOL/L (ref 96–112)
CHOLEST SERPL-MCNC: 215 MG/DL (ref 100–199)
CO2 SERPL-SCNC: 27 MMOL/L (ref 20–33)
COLOR UR: YELLOW
CREAT SERPL-MCNC: 0.72 MG/DL (ref 0.5–1.4)
EKG IMPRESSION: NORMAL
EOSINOPHIL # BLD AUTO: 0.08 K/UL (ref 0–0.51)
EOSINOPHIL NFR BLD: 0.7 % (ref 0–6.9)
ERYTHROCYTE [DISTWIDTH] IN BLOOD BY AUTOMATED COUNT: 39.7 FL (ref 35.9–50)
GFR SERPL CREATININE-BSD FRML MDRD: >60 ML/MIN/1.73 M 2
GLOBULIN SER CALC-MCNC: 2.8 G/DL (ref 1.9–3.5)
GLUCOSE SERPL-MCNC: 123 MG/DL (ref 65–99)
GLUCOSE UR STRIP.AUTO-MCNC: NEGATIVE MG/DL
HCT VFR BLD AUTO: 48.5 % (ref 42–52)
HDLC SERPL-MCNC: 47 MG/DL
HGB BLD-MCNC: 17.1 G/DL (ref 14–18)
IMM GRANULOCYTES # BLD AUTO: 0.06 K/UL (ref 0–0.11)
IMM GRANULOCYTES NFR BLD AUTO: 0.5 % (ref 0–0.9)
KETONES UR STRIP.AUTO-MCNC: NEGATIVE MG/DL
LDLC SERPL CALC-MCNC: 135 MG/DL
LEUKOCYTE ESTERASE UR QL STRIP.AUTO: NEGATIVE
LIPASE SERPL-CCNC: 8 U/L (ref 11–82)
LYMPHOCYTES # BLD AUTO: 2.93 K/UL (ref 1–4.8)
LYMPHOCYTES NFR BLD: 26 % (ref 22–41)
MAGNESIUM SERPL-MCNC: 2.4 MG/DL (ref 1.5–2.5)
MCH RBC QN AUTO: 32.5 PG (ref 27–33)
MCHC RBC AUTO-ENTMCNC: 35.3 G/DL (ref 33.7–35.3)
MCV RBC AUTO: 92.2 FL (ref 81.4–97.8)
MICRO URNS: NORMAL
MONOCYTES # BLD AUTO: 1 K/UL (ref 0–0.85)
MONOCYTES NFR BLD AUTO: 8.9 % (ref 0–13.4)
NEUTROPHILS # BLD AUTO: 7.17 K/UL (ref 1.82–7.42)
NEUTROPHILS NFR BLD: 63.6 % (ref 44–72)
NITRITE UR QL STRIP.AUTO: NEGATIVE
NRBC # BLD AUTO: 0 K/UL
NRBC BLD AUTO-RTO: 0 /100 WBC
PH UR STRIP.AUTO: 7.5 [PH]
PLATELET # BLD AUTO: 329 K/UL (ref 164–446)
PMV BLD AUTO: 9.7 FL (ref 9–12.9)
POTASSIUM SERPL-SCNC: 3.4 MMOL/L (ref 3.6–5.5)
PROT SERPL-MCNC: 6.9 G/DL (ref 6–8.2)
PROT UR QL STRIP: NEGATIVE MG/DL
RBC # BLD AUTO: 5.26 M/UL (ref 4.7–6.1)
RBC UR QL AUTO: NEGATIVE
SALICYLATES SERPL-MCNC: 0 MG/DL (ref 15–25)
SODIUM SERPL-SCNC: 136 MMOL/L (ref 135–145)
SP GR UR REFRACTOMETRY: >1.035
TRIGL SERPL-MCNC: 164 MG/DL (ref 0–149)
TROPONIN I SERPL-MCNC: <0.01 NG/ML (ref 0–0.04)
UROBILINOGEN UR STRIP.AUTO-MCNC: 0.2 MG/DL
WBC # BLD AUTO: 11.3 K/UL (ref 4.8–10.8)

## 2017-10-25 PROCEDURE — 700102 HCHG RX REV CODE 250 W/ 637 OVERRIDE(OP): Performed by: HOSPITALIST

## 2017-10-25 PROCEDURE — A9270 NON-COVERED ITEM OR SERVICE: HCPCS | Performed by: HOSPITALIST

## 2017-10-25 PROCEDURE — 85025 COMPLETE CBC W/AUTO DIFF WBC: CPT

## 2017-10-25 PROCEDURE — 80061 LIPID PANEL: CPT

## 2017-10-25 PROCEDURE — 93005 ELECTROCARDIOGRAM TRACING: CPT | Performed by: HOSPITALIST

## 2017-10-25 PROCEDURE — 99217 PR OBSERVATION CARE DISCHARGE: CPT | Performed by: HOSPITALIST

## 2017-10-25 PROCEDURE — 96376 TX/PRO/DX INJ SAME DRUG ADON: CPT

## 2017-10-25 PROCEDURE — 83735 ASSAY OF MAGNESIUM: CPT

## 2017-10-25 PROCEDURE — 700105 HCHG RX REV CODE 258: Performed by: HOSPITALIST

## 2017-10-25 PROCEDURE — 80307 DRUG TEST PRSMV CHEM ANLYZR: CPT

## 2017-10-25 PROCEDURE — 83690 ASSAY OF LIPASE: CPT

## 2017-10-25 PROCEDURE — G0480 DRUG TEST DEF 1-7 CLASSES: HCPCS

## 2017-10-25 PROCEDURE — 84484 ASSAY OF TROPONIN QUANT: CPT

## 2017-10-25 PROCEDURE — 700111 HCHG RX REV CODE 636 W/ 250 OVERRIDE (IP): Performed by: HOSPITALIST

## 2017-10-25 PROCEDURE — 80053 COMPREHEN METABOLIC PANEL: CPT

## 2017-10-25 PROCEDURE — G0378 HOSPITAL OBSERVATION PER HR: HCPCS

## 2017-10-25 PROCEDURE — 93010 ELECTROCARDIOGRAM REPORT: CPT | Performed by: INTERNAL MEDICINE

## 2017-10-25 RX ORDER — OMEPRAZOLE 20 MG/1
20 CAPSULE, DELAYED RELEASE ORAL 2 TIMES DAILY
Qty: 60 CAP | Refills: 1 | Status: SHIPPED | OUTPATIENT
Start: 2017-10-25 | End: 2018-01-14

## 2017-10-25 RX ADMIN — LORAZEPAM 0.5 MG: 1 TABLET ORAL at 01:09

## 2017-10-25 RX ADMIN — ASPIRIN 325 MG: 325 TABLET, COATED ORAL at 08:16

## 2017-10-25 RX ADMIN — MORPHINE SULFATE 4 MG: 4 INJECTION INTRAVENOUS at 06:56

## 2017-10-25 RX ADMIN — ACETAMINOPHEN 650 MG: 325 TABLET, FILM COATED ORAL at 09:34

## 2017-10-25 RX ADMIN — DEXTROSE AND SODIUM CHLORIDE: 5; 450 INJECTION, SOLUTION INTRAVENOUS at 05:30

## 2017-10-25 RX ADMIN — OMEPRAZOLE 20 MG: 20 CAPSULE, DELAYED RELEASE ORAL at 08:16

## 2017-10-25 ASSESSMENT — LIFESTYLE VARIABLES
AGITATION: NORMAL ACTIVITY
TOTAL SCORE: 5
VISUAL DISTURBANCES: NOT PRESENT
ORIENTATION AND CLOUDING OF SENSORIUM: ORIENTED AND CAN DO SERIAL ADDITIONS
NAUSEA AND VOMITING: NO NAUSEA AND NO VOMITING
AUDITORY DISTURBANCES: NOT PRESENT
AGITATION: NORMAL ACTIVITY
PAROXYSMAL SWEATS: *
ANXIETY: MILDLY ANXIOUS
NAUSEA AND VOMITING: NO NAUSEA AND NO VOMITING
AGITATION: NORMAL ACTIVITY
TOTAL SCORE: 3
NAUSEA AND VOMITING: NO NAUSEA AND NO VOMITING
TOTAL SCORE: 0
ANXIETY: NO ANXIETY (AT EASE)
AUDITORY DISTURBANCES: NOT PRESENT
TREMOR: TREMOR NOT VISIBLE BUT CAN BE FELT, FINGERTIP TO FINGERTIP
TREMOR: NO TREMOR
VISUAL DISTURBANCES: NOT PRESENT
HEADACHE, FULLNESS IN HEAD: NOT PRESENT
HEADACHE, FULLNESS IN HEAD: NOT PRESENT
PAROXYSMAL SWEATS: NO SWEAT VISIBLE
VISUAL DISTURBANCES: NOT PRESENT
ORIENTATION AND CLOUDING OF SENSORIUM: ORIENTED AND CAN DO SERIAL ADDITIONS
ORIENTATION AND CLOUDING OF SENSORIUM: ORIENTED AND CAN DO SERIAL ADDITIONS
TREMOR: TREMOR NOT VISIBLE BUT CAN BE FELT, FINGERTIP TO FINGERTIP
PAROXYSMAL SWEATS: *
HEADACHE, FULLNESS IN HEAD: NOT PRESENT
AUDITORY DISTURBANCES: NOT PRESENT
ANXIETY: NO ANXIETY (AT EASE)

## 2017-10-25 ASSESSMENT — PAIN SCALES - GENERAL
PAINLEVEL_OUTOF10: 0
PAINLEVEL_OUTOF10: 7
PAINLEVEL_OUTOF10: 0
PAINLEVEL_OUTOF10: 5

## 2017-10-25 NOTE — DISCHARGE INSTRUCTIONS
Chest Pain, Nonspecific  It is often hard to give a specific diagnosis for the cause of chest pain. There is always a chance that your pain could be related to something serious, like a heart attack or a blood clot in the lungs. You need to follow up with your caregiver for further evaluation. More lab tests or other studies such as X-rays, electrocardiography, stress testing, or cardiac imaging may be needed to find the cause of your pain.  Most of the time, nonspecific chest pain improves within 2 to 3 days with rest and mild pain medicine. For the next few days, avoid physical exertion or activities that bring on pain. Do not smoke. Avoid drinking alcohol. Call your caregiver for routine follow-up as advised.   SEEK IMMEDIATE MEDICAL CARE IF:  · You develop increased chest pain or pain that radiates to the arm, neck, jaw, back, or abdomen.   · You develop shortness of breath, increased coughing, or you start coughing up blood.   · You have severe back or abdominal pain, nausea, or vomiting.   · You develop severe weakness, fainting, fever, or chills.   Document Released: 12/18/2006 Document Revised: 03/11/2013 Document Reviewed: 06/06/2008  [employer]® Patient Information ©2013 [employer].  Discharge Instructions    Discharged to home by car with relative. Discharged via walking, hospital escort: Yes.  Special equipment needed: Not Applicable    Be sure to schedule a follow-up appointment with your primary care doctor or any specialists as instructed.     Discharge Plan:   Diet Plan: Discussed  Activity Level: Discussed  Smoking Cessation Offered: Patient Refused  Confirmed Follow up Appointment: Patient to Call and Schedule Appointment  Confirmed Symptoms Management: Discussed  Medication Reconciliation Updated: Yes  Influenza Vaccine Indication: Patient Refuses    I understand that a diet low in cholesterol, fat, and sodium is recommended for good health. Unless I have been given specific instructions below  for another diet, I accept this instruction as my diet prescription.   Other diet: Regular    Special Instructions: None    · Is patient discharged on Warfarin / Coumadin?   No     · Is patient Post Blood Transfusion?  No    Depression / Suicide Risk    As you are discharged from this [facility] facility, it is important to learn how to keep safe from harming yourself.    Recognize the warning signs:  · Abrupt changes in personality, positive or negative- including increase in energy   · Giving away possessions  · Change in eating patterns- significant weight changes-  positive or negative  · Change in sleeping patterns- unable to sleep or sleeping all the time   · Unwillingness or inability to communicate  · Depression  · Unusual sadness, discouragement and loneliness  · Talk of wanting to die  · Neglect of personal appearance   · Rebelliousness- reckless behavior  · Withdrawal from people/activities they love  · Confusion- inability to concentrate     If you or a loved one observes any of these behaviors or has concerns about self-harm, here's what you can do:  · Talk about it- your feelings and reasons for harming yourself  · Remove any means that you might use to hurt yourself (examples: pills, rope, extension cords, firearm)  · Get professional help from the community (Mental Health, Substance Abuse, psychological counseling)  · Do not be alone:Call your Safe Contact- someone whom you trust who will be there for you.  · Call your local CRISIS HOTLINE 233-4699 or 583-267-6034  · Call your local Children's Mobile Crisis Response Team [place] (937) 985-0790 or www.[employer]  · Call the toll free National Suicide Prevention Hotlines   · National Suicide Prevention Lifeline 763-137-XYBH (7857)  · National Hope Line Network 800-SUICIDE (636-5416)

## 2017-10-25 NOTE — PROGRESS NOTES
Assumed care of Pt at 0700 after report from IVAN Don, assessment complete.  Pt sleepin, but easily awakened for AM assessment; A & O X 4, VSS, nsr; Pt denies chest pain or pressure, but c/o epigastric pain, discomfort at this time.  Pt updated on and understands POC, medicated per MAR, given hot meal tray.  Bed in low position, call light within reach - will continue to monitor.    Pt given and understands discharge instructions, (1)Rx.  PIV removed, covered and wrapped with coban.  Pt to killian, discharged home.

## 2017-10-25 NOTE — PROGRESS NOTES
"Interval History:  31-year-old gentleman who presents as a \"STEMI\" activation. He has 1 week of intermittent chest pain worse with movement and palpation. EKG in the emergency department does not show evidence of ST elevation myocardial infarction or ischemic changes. He is an active smoker who drinks alcohol and appears impaired currently. He is also an active methamphetamine abuser. He has no other cardiac symptoms prior to this. In the emergency department he is hemodynamically and electrically stable with an EKG compatible with J-point elevation.   10/25: No cardiac events.    Physical Exam   Blood pressure 133/90, pulse 76, temperature 36.4 °C (97.6 °F), resp. rate 15, height 1.803 m (5' 11\"), weight 66 kg (145 lb 8.1 oz), SpO2 99 %.    Constitutional: Thin. Appears well-developed.   HENT: Normocephalic and atraumatic. No scleral icterus.   Neck: No JVD present.   Cardiovascular: Normal rate. Exam reveals no gallop and no friction rub. No murmur heard.   Pulmonary/Chest: CTAB    Abdominal: S/NT/ND BS+   Musculoskeletal: Exhibits no edema. Pulses present.  Skin: Skin is warm and dry.     ROS: As HPI other reviewed and negative       Intake/Output Summary (Last 24 hours) at 10/25/17 0740  Last data filed at 10/25/17 0600   Gross per 24 hour   Intake                0 ml   Output             1760 ml   Net            -1760 ml       Recent Labs      10/24/17   1150  10/25/17   0005   WBC  12.0*  11.3*   RBC  5.40  5.26   HEMOGLOBIN  17.7  17.1   HEMATOCRIT  49.9  48.5   MCV  92.4  92.2   MCH  32.8  32.5   MCHC  35.5*  35.3   RDW  40.2  39.7   PLATELETCT  324  329   MPV  9.6  9.7     Recent Labs      10/24/17   1150  10/25/17   0005   SODIUM  135  136   POTASSIUM  3.9  3.4*   CHLORIDE  102  100   CO2  25  27   GLUCOSE  116*  123*   BUN  11  9   CREATININE  0.88  0.72   CALCIUM  10.1  9.6     Recent Labs      10/24/17   1150   APTT  27.6   INR  0.94     Recent Labs      10/24/17   1150   BNPBTYPENAT  5     Recent Labs "      10/24/17   1150  10/24/17   1910  10/25/17   0005   TROPONINI  <0.01  <0.01  <0.01   BNPBTYPENAT  5   --    --      Recent Labs      10/25/17   0005   TRIGLYCERIDE  164*   HDL  47   LDL  135*       No current facility-administered medications on file prior to encounter.      No current outpatient prescriptions on file prior to encounter.       Current Facility-Administered Medications   Medication Dose Frequency Provider Last Rate Last Dose   • senna-docusate (PERICOLACE or SENOKOT S) 8.6-50 MG per tablet 2 Tab  2 Tab BID Boston Ariza D.O.   2 Tab at 10/24/17 2041    And   • polyethylene glycol/lytes (MIRALAX) PACKET 1 Packet  1 Packet QDAY PRN LISA [email or web address].OVicky        And   • magnesium hydroxide (MILK OF MAGNESIA) suspension 30 mL  30 mL QDAY PRN LISA Dalal.O.        And   • bisacodyl (DULCOLAX) suppository 10 mg  10 mg QDAY PRN LISA Dalal.O.       • aspirin (ASA) tablet 325 mg  325 mg DAILY EMILEE DalalOVicky        Or   • aspirin (ASA) chewable tab 324 mg  324 mg DAILY LISA Dalal.OVicky        Or   • aspirin (ASA) suppository 300 mg  300 mg DAILY LISA Dalal.O.       • morphine (pf) 4 mg/ml injection 2-4 mg  2-4 mg Q3HRS PRN LISA Dalal.O.   4 mg at 10/25/17 0656   • omeprazole (PRILOSEC) capsule 20 mg  20 mg DAILY LISA Dalal.O.   20 mg at 10/24/17 1605   • D5 1/2 NS infusion   Continuous LISA Dalal.O. 75 mL/hr at 10/25/17 0600     • acetaminophen (TYLENOL) tablet 650 mg  650 mg Q6HRS PRN LISA Dalal.O.       • ondansetron (ZOFRAN) syringe/vial injection 4 mg  4 mg Q4HRS PRN LISA Dalal.O.       • ondansetron (ZOFRAN ODT) dispertab 4 mg  4 mg Q4HRS PRN EMILEE DalalO.       • promethazine (PHENERGAN) tablet 12.5-25 mg  12.5-25 mg Q4HRS PRN LISA Dalal.O.       • promethazine (PHENERGAN) suppository 12.5-25 mg  12.5-25 mg  Q4HRS PRN Boston Ariza, D.O.       • prochlorperazine (COMPAZINE) injection 5-10 mg  5-10 mg Q4HRS PRN Boston Ariza, D.O.       • temazepam (RESTORIL) capsule 15 mg  15 mg HS PRN - MR X 1 Boston Ariza, D.O.       • lorazepam (ATIVAN) tablet 0.5 mg  0.5 mg Q4HRS PRN Boston Ariza, D.O.   0.5 mg at 10/25/17 0109   • lorazepam (ATIVAN) tablet 1 mg  1 mg Q4HRS PRN Boston Ariza, D.O.        Or   • lorazepam (ATIVAN) injection 0.5 mg  0.5 mg Q4HRS PRN Boston Ariza, D.O.   0.5 mg at 10/24/17 2044   • lorazepam (ATIVAN) tablet 2 mg  2 mg Q2HRS PRN Boston Ariza, D.O.   2 mg at 10/24/17 1706    Or   • lorazepam (ATIVAN) injection 1 mg  1 mg Q2HRS PRN Boston Ariza, D.O.       • lorazepam (ATIVAN) tablet 3 mg  3 mg Q HOUR PRN Boston Ariza, D.O.        Or   • lorazepam (ATIVAN) injection 1.5 mg  1.5 mg Q HOUR PRN Boston Ariza, D.O.       • lorazepam (ATIVAN) tablet 4 mg  4 mg Q15 MIN PRN Boston Ariza, D.O.        Or   • lorazepam (ATIVAN) injection 2 mg  2 mg Q15 MIN PRN Boston Ariza, D.O.         Last reviewed on 10/24/2017 12:40 PM by Ghanshyam Quispe  Medications reviewed    Imaging reviewed      Impressions:  1. Musculoskeletal chest pain  2. Tobacco abuse  3. Polysubstance abuse  4. Epigastric pain    Recommendations:  No specific cardiovascular recommendations aside from smoking cessation and polysubstance abstinence. I do recommend checking an echocardiogram at some point which can be done as an outpatient due to his drug use.    Thank you for the consultation. Will sign off. Please call with any questions.

## 2017-10-25 NOTE — H&P
DATE OF ADMISSION:  10/24/2017.    DATE OF DISCHARGE:  10/25/2017.    DISCHARGE DIAGNOSES:  1.  Epigastric pain ? alcoholic gastritis.  2.  Chest pain.  3.  History of methamphetamine abuse.  4.  History of heavy alcohol use.  5.  History of tobacco abuse.  6.  Distant history of peptic ulcer disease.    CONSULTANTS:  Dr. Xavi Angulo, interventional cardiology.    IMAGING STUDIES:  1.  CT of the abdomen and pelvis 10/24/2017.  Findings:  No acute findings.  2.  Portable chest x-ray 10/24/2017.  Findings:  No acute findings.    LABS ON DISCHARGE:  White count 11.3, hemoglobin 17.1, and platelet count   329,000.  Sodium 136, potassium 3.4, BUN 9, and creatinine 0.72.  LFTs are   unremarkable.  Phosphorus 3.2, mag 2.4, lipase 8, total cholesterol 215,   triglycerides 164, HDL 47, and .  Troponin I less than 0.01 x3   occasions.  UA unremarkable.    HOSPITAL COURSE:  Briefly, this is a 31-year-old gentleman with a history of   polysubstance abuse as noted above.  He presented for evaluation of epigastric   pain radiating up into his chest.  His initial EKG was interpreted by the ER   staff as possible STEMI and therefore, cardiology was consulted.  Dr. Xavi Angulo evaluated the patient and felt it was in fact not a STEMI.  He did   not think that this represents cardiac ischemia.    Patient was admitted to telemetry and serial troponins were followed.    Additionally, workup of his abdominal pain was completed with CT of his   abdomen and pelvis.  This study was benign.  Based on his history of peptic   ulcer disease, his heavy alcohol use, the location of his symptoms, and his   examination, it was felt that perhaps this was alcohol-induced gastritis or   peptic ulcer manifestation.  He was started empirically on b.i.d. omeprazole,   and observed overnight.    The patient did well and in the morning, his pain, though not completely   resolved is markedly improved.  His labs have remained benign and his    examination is minimally remarkable for some mild epigastric pain, but much   less than on admission and no peritoneal findings.  At this point in time, we   will be discharging the patient home.    MEDICATIONS:  Omeprazole 20 mg p.o. b.i.d.    FOLLOW UP:  The patient is to follow up with his primary care physician in the   next week.  He is to come back to emergency room immediately if he develops   any return or worsening of his symptoms.  I have reviewed with him at length   that he needs to stop using meth and alcohol as heavily he does, as well as   quitting smoking.  I have also reviewed with him that it is very important   that he comes back should his symptoms worsen in anyway.  We have not   entertained further cardiac workup for reasons as noted above.    Discharge time 35 minutes.       ____________________________________     DO BLANE Manzano / JANETT    DD:  10/25/2017 16:03:40  DT:  10/25/2017 16:33:24    D#:  [id number]  Job#:  [id number]

## 2017-10-25 NOTE — PROGRESS NOTES
Assessment per flow chart. Pt on tele monitor,SR noted. Pt A&Ox4, denies pain, nausea.  Left A/C IV site- c,d,i.  Pt's SpO2-100% on 2L n/c.  Discussed POC with pt, pt verbalized understanding and agreement.  Pt instructed to call for assistance, pt's belonging's and call light within reach. Will continue to monitor.

## 2017-10-25 NOTE — PROGRESS NOTES
Pt has been sleeping on and off, is stable with arousal. Denies pain, nausea, or needs at this time. Call light and personal possessions within reach, will continue to monitor.

## 2017-10-26 LAB
BENZODIAZ SERPL QL: NEGATIVE
TRICYCLICS SERPL QL: NEGATIVE

## 2017-10-29 LAB
AMOBARBITAL SERPL-MCNC: <50 NG/ML
BUTALBITAL SERPL-MCNC: <50 NG/ML
PENTOBARB SERPL-MCNC: <50 NG/ML
PHENOBARB SERPL-MCNC: <50 NG/ML
SECOBARBITAL SERPL-MCNC: <50 NG/ML

## 2017-10-31 NOTE — DISCHARGE SUMMARY
DATE OF ADMISSION:  10/24/2017.    DATE OF DISCHARGE:  10/25/2017.    DISCHARGE DIAGNOSES:  1.  Epigastric pain ? alcoholic gastritis.  2.  Chest pain.  3.  History of methamphetamine abuse.  4.  History of heavy alcohol use.  5.  History of tobacco abuse.  6.  Distant history of peptic ulcer disease.    CONSULTANTS:  Dr. Xavi Angulo, interventional cardiology.    IMAGING STUDIES:  1.  CT of the abdomen and pelvis 10/24/2017.  Findings:  No acute findings.  2.  Portable chest x-ray 10/24/2017.  Findings:  No acute findings.    LABS ON DISCHARGE:  White count 11.3, hemoglobin 17.1, and platelet count   329,000.  Sodium 136, potassium 3.4, BUN 9, and creatinine 0.72.  LFTs are   unremarkable.  Phosphorus 3.2, mag 2.4, lipase 8, total cholesterol 215,   triglycerides 164, HDL 47, and .  Troponin I less than 0.01 x3   occasions.  UA unremarkable.    HOSPITAL COURSE:  Briefly, this is a 31-year-old gentleman with a history of   polysubstance abuse as noted above.  He presented for evaluation of epigastric   pain radiating up into his chest.  His initial EKG was interpreted by the ER   staff as possible STEMI and therefore, cardiology was consulted.  Dr. Xavi Angulo evaluated the patient and felt it was in fact not a STEMI.  He did   not think that this represents cardiac ischemia.    Patient was admitted to telemetry and serial troponins were followed.    Additionally, workup of his abdominal pain was completed with CT of his   abdomen and pelvis.  This study was benign.  Based on his history of peptic   ulcer disease, his heavy alcohol use, the location of his symptoms, and his   examination, it was felt that perhaps this was alcohol-induced gastritis or   peptic ulcer manifestation.  He was started empirically on b.i.d. omeprazole,   and observed overnight.    The patient did well and in the morning, his pain, though not completely   resolved is markedly improved.  His labs have remained benign and his    examination is minimally remarkable for some mild epigastric pain, but much   less than on admission and no peritoneal findings.  At this point in time, we   will be discharging the patient home.    MEDICATIONS:  Omeprazole 20 mg p.o. b.i.d.    FOLLOW UP:  The patient is to follow up with his primary care physician in the   next week.  He is to come back to emergency room immediately if he develops   any return or worsening of his symptoms.  I have reviewed with him at length   that he needs to stop using meth and alcohol as heavily he does, as well as   quitting smoking.  I have also reviewed with him that it is very important   that he comes back should his symptoms worsen in anyway.  We have not   entertained further cardiac workup for reasons as noted above.    Discharge time 35 minutes.       ____________________________________     DO BLANE Manzano / JANETT    DD:  10/25/2017 16:03:40  DT:  10/25/2017 16:33:24    D#:  [id number]  Job#:  [id number]

## 2018-01-14 ENCOUNTER — HOSPITAL ENCOUNTER (EMERGENCY)
Facility: MEDICAL CENTER | Age: 33
End: 2018-01-14
Attending: EMERGENCY MEDICINE
Payer: MEDICAID

## 2018-01-14 VITALS
TEMPERATURE: 97.4 F | HEIGHT: 71 IN | WEIGHT: 153.22 LBS | RESPIRATION RATE: 17 BRPM | BODY MASS INDEX: 21.45 KG/M2 | SYSTOLIC BLOOD PRESSURE: 117 MMHG | OXYGEN SATURATION: 99 % | HEART RATE: 69 BPM | DIASTOLIC BLOOD PRESSURE: 93 MMHG

## 2018-01-14 DIAGNOSIS — R10.13 EPIGASTRIC PAIN: ICD-10-CM

## 2018-01-14 DIAGNOSIS — F19.10 POLYSUBSTANCE ABUSE (HCC): ICD-10-CM

## 2018-01-14 LAB
ALBUMIN SERPL BCP-MCNC: 4.7 G/DL (ref 3.2–4.9)
ALBUMIN/GLOB SERPL: 1.8 G/DL
ALP SERPL-CCNC: 103 U/L (ref 30–99)
ALT SERPL-CCNC: 24 U/L (ref 2–50)
AMMONIA PLAS-SCNC: 33 UMOL/L (ref 11–45)
AMPHET UR QL SCN: POSITIVE
ANION GAP SERPL CALC-SCNC: 9 MMOL/L (ref 0–11.9)
APPEARANCE UR: ABNORMAL
APTT PPP: 26.8 SEC (ref 24.7–36)
AST SERPL-CCNC: 16 U/L (ref 12–45)
BACTERIA #/AREA URNS HPF: NEGATIVE /HPF
BARBITURATES UR QL SCN: NEGATIVE
BASOPHILS # BLD AUTO: 0.3 % (ref 0–1.8)
BASOPHILS # BLD: 0.04 K/UL (ref 0–0.12)
BENZODIAZ UR QL SCN: NEGATIVE
BILIRUB SERPL-MCNC: 0.9 MG/DL (ref 0.1–1.5)
BILIRUB UR QL STRIP.AUTO: NEGATIVE
BUN SERPL-MCNC: 12 MG/DL (ref 8–22)
BZE UR QL SCN: NEGATIVE
CALCIUM SERPL-MCNC: 9.7 MG/DL (ref 8.5–10.5)
CANNABINOIDS UR QL SCN: POSITIVE
CHLORIDE SERPL-SCNC: 98 MMOL/L (ref 96–112)
CO2 SERPL-SCNC: 31 MMOL/L (ref 20–33)
COLOR UR: ABNORMAL
CREAT SERPL-MCNC: 0.8 MG/DL (ref 0.5–1.4)
CULTURE IF INDICATED INDCX: NO UA CULTURE
EOSINOPHIL # BLD AUTO: 0.02 K/UL (ref 0–0.51)
EOSINOPHIL NFR BLD: 0.2 % (ref 0–6.9)
EPI CELLS #/AREA URNS HPF: ABNORMAL /HPF
ERYTHROCYTE [DISTWIDTH] IN BLOOD BY AUTOMATED COUNT: 39.8 FL (ref 35.9–50)
ETHANOL BLD-MCNC: 0 G/DL
GLOBULIN SER CALC-MCNC: 2.6 G/DL (ref 1.9–3.5)
GLUCOSE SERPL-MCNC: 119 MG/DL (ref 65–99)
GLUCOSE UR STRIP.AUTO-MCNC: NEGATIVE MG/DL
HCT VFR BLD AUTO: 51.3 % (ref 42–52)
HGB BLD-MCNC: 18.5 G/DL (ref 14–18)
HYALINE CASTS #/AREA URNS LPF: ABNORMAL /LPF
IMM GRANULOCYTES # BLD AUTO: 0.05 K/UL (ref 0–0.11)
IMM GRANULOCYTES NFR BLD AUTO: 0.4 % (ref 0–0.9)
INR PPP: 0.98 (ref 0.87–1.13)
KETONES UR STRIP.AUTO-MCNC: 40 MG/DL
LEUKOCYTE ESTERASE UR QL STRIP.AUTO: NEGATIVE
LIPASE SERPL-CCNC: 19 U/L (ref 11–82)
LYMPHOCYTES # BLD AUTO: 1.86 K/UL (ref 1–4.8)
LYMPHOCYTES NFR BLD: 15 % (ref 22–41)
MCH RBC QN AUTO: 32.2 PG (ref 27–33)
MCHC RBC AUTO-ENTMCNC: 36.1 G/DL (ref 33.7–35.3)
MCV RBC AUTO: 89.2 FL (ref 81.4–97.8)
METHADONE UR QL SCN: NEGATIVE
MICRO URNS: ABNORMAL
MONOCYTES # BLD AUTO: 1.06 K/UL (ref 0–0.85)
MONOCYTES NFR BLD AUTO: 8.5 % (ref 0–13.4)
NEUTROPHILS # BLD AUTO: 9.37 K/UL (ref 1.82–7.42)
NEUTROPHILS NFR BLD: 75.6 % (ref 44–72)
NITRITE UR QL STRIP.AUTO: NEGATIVE
NRBC # BLD AUTO: 0 K/UL
NRBC BLD-RTO: 0 /100 WBC
OPIATES UR QL SCN: NEGATIVE
OXYCODONE UR QL SCN: NEGATIVE
PCP UR QL SCN: NEGATIVE
PH UR STRIP.AUTO: 8.5 [PH]
PLATELET # BLD AUTO: 315 K/UL (ref 164–446)
PMV BLD AUTO: 9.8 FL (ref 9–12.9)
POTASSIUM SERPL-SCNC: 3.8 MMOL/L (ref 3.6–5.5)
PROPOXYPH UR QL SCN: NEGATIVE
PROT SERPL-MCNC: 7.3 G/DL (ref 6–8.2)
PROT UR QL STRIP: NEGATIVE MG/DL
PROTHROMBIN TIME: 12.7 SEC (ref 12–14.6)
RBC # BLD AUTO: 5.75 M/UL (ref 4.7–6.1)
RBC # URNS HPF: ABNORMAL /HPF
RBC UR QL AUTO: NEGATIVE
SODIUM SERPL-SCNC: 138 MMOL/L (ref 135–145)
SP GR UR STRIP.AUTO: 1.02
UROBILINOGEN UR STRIP.AUTO-MCNC: 1 MG/DL
WBC # BLD AUTO: 12.4 K/UL (ref 4.8–10.8)
WBC #/AREA URNS HPF: ABNORMAL /HPF

## 2018-01-14 PROCEDURE — 700111 HCHG RX REV CODE 636 W/ 250 OVERRIDE (IP): Performed by: EMERGENCY MEDICINE

## 2018-01-14 PROCEDURE — 83690 ASSAY OF LIPASE: CPT

## 2018-01-14 PROCEDURE — 80053 COMPREHEN METABOLIC PANEL: CPT

## 2018-01-14 PROCEDURE — 96375 TX/PRO/DX INJ NEW DRUG ADDON: CPT

## 2018-01-14 PROCEDURE — 36415 COLL VENOUS BLD VENIPUNCTURE: CPT

## 2018-01-14 PROCEDURE — 700101 HCHG RX REV CODE 250: Performed by: EMERGENCY MEDICINE

## 2018-01-14 PROCEDURE — 700102 HCHG RX REV CODE 250 W/ 637 OVERRIDE(OP): Performed by: EMERGENCY MEDICINE

## 2018-01-14 PROCEDURE — A9270 NON-COVERED ITEM OR SERVICE: HCPCS | Performed by: EMERGENCY MEDICINE

## 2018-01-14 PROCEDURE — 96365 THER/PROPH/DIAG IV INF INIT: CPT

## 2018-01-14 PROCEDURE — 85610 PROTHROMBIN TIME: CPT

## 2018-01-14 PROCEDURE — 80307 DRUG TEST PRSMV CHEM ANLYZR: CPT

## 2018-01-14 PROCEDURE — 96366 THER/PROPH/DIAG IV INF ADDON: CPT

## 2018-01-14 PROCEDURE — 85025 COMPLETE CBC W/AUTO DIFF WBC: CPT

## 2018-01-14 PROCEDURE — 82140 ASSAY OF AMMONIA: CPT

## 2018-01-14 PROCEDURE — 94760 N-INVAS EAR/PLS OXIMETRY 1: CPT

## 2018-01-14 PROCEDURE — 700105 HCHG RX REV CODE 258: Performed by: EMERGENCY MEDICINE

## 2018-01-14 PROCEDURE — 99285 EMERGENCY DEPT VISIT HI MDM: CPT

## 2018-01-14 PROCEDURE — 81001 URINALYSIS AUTO W/SCOPE: CPT | Mod: 59

## 2018-01-14 PROCEDURE — 85730 THROMBOPLASTIN TIME PARTIAL: CPT

## 2018-01-14 RX ORDER — KETOROLAC TROMETHAMINE 30 MG/ML
30 INJECTION, SOLUTION INTRAMUSCULAR; INTRAVENOUS ONCE
Status: COMPLETED | OUTPATIENT
Start: 2018-01-14 | End: 2018-01-14

## 2018-01-14 RX ORDER — ONDANSETRON 2 MG/ML
4 INJECTION INTRAMUSCULAR; INTRAVENOUS ONCE
Status: COMPLETED | OUTPATIENT
Start: 2018-01-14 | End: 2018-01-14

## 2018-01-14 RX ORDER — OMEPRAZOLE 20 MG/1
20 CAPSULE, DELAYED RELEASE ORAL 2 TIMES DAILY
Qty: 60 CAP | Refills: 1 | Status: SHIPPED | OUTPATIENT
Start: 2018-01-14 | End: 2018-04-25

## 2018-01-14 RX ORDER — ONDANSETRON 4 MG/1
4 TABLET, ORALLY DISINTEGRATING ORAL EVERY 6 HOURS PRN
Qty: 10 TAB | Refills: 0 | Status: SHIPPED | OUTPATIENT
Start: 2018-01-14 | End: 2018-04-25

## 2018-01-14 RX ORDER — SODIUM CHLORIDE 9 MG/ML
1000 INJECTION, SOLUTION INTRAVENOUS ONCE
Status: COMPLETED | OUTPATIENT
Start: 2018-01-14 | End: 2018-01-14

## 2018-01-14 RX ADMIN — SODIUM CHLORIDE 1000 ML: 9 INJECTION, SOLUTION INTRAVENOUS at 09:16

## 2018-01-14 RX ADMIN — KETOROLAC TROMETHAMINE 30 MG: 30 INJECTION, SOLUTION INTRAMUSCULAR at 09:16

## 2018-01-14 RX ADMIN — ONDANSETRON 4 MG: 2 INJECTION INTRAMUSCULAR; INTRAVENOUS at 09:16

## 2018-01-14 RX ADMIN — LIDOCAINE HYDROCHLORIDE 30 ML: 20 SOLUTION OROPHARYNGEAL at 09:58

## 2018-01-14 RX ADMIN — THIAMINE HYDROCHLORIDE: 100 INJECTION, SOLUTION INTRAMUSCULAR; INTRAVENOUS at 09:58

## 2018-01-14 ASSESSMENT — PAIN SCALES - GENERAL: PAINLEVEL_OUTOF10: 9

## 2018-01-14 NOTE — ED PROVIDER NOTES
"ED Provider Note    Scribed for Sharon Rubio M.D. by Jennifer Chambers. 1/14/2018  8:54 AM    Primary care provider: Pcp Pt States None  Means of arrival: walk in   History obtained from: patient   History limited by: none      CHIEF COMPLAINT  Abdominal Pain        Chief Complaint   Patient presents with   • Flu Like Symptoms     amb to triage, reports n/v. fever h/a, aching x 3 days.  Unable to fluids down.  taking advil for sympt.  educated in triage, mask placed on pt, givn extra emesis bag, actively vomiting in triage.  returned to [facility].       [facility]  Justice Ortega is a 32 y.o. male who presents to the Emergency Department for evaluation of constant generalized abdominal pain onset 3 days ago. Patient reports associated vomiting. However, he denies loss of appetite and decreased fluid intake.  Patient admits to drinking a \"half bottle of vodka\" everyday for the past 13 years. His last drink was two days ago. He denies diarrhea, fever, sore throat, cough and runny nose. Patient has no history of abdominal pain or surgeries. . He denies any drug use. The patient is very sleepy and falls asleep during my questioning. He is a very poor informant      REVIEW OF SYSTEMS  General: no loss of appetite or decreased fluid intake.   HEENT:  No rhinorrhea.   PULMONARY: no cough.   GI: Generalized abdominal pain and vomiting. No diarrhea.   Endocrine: no fevers  As above, all other systems are negative. C.   See history of present illness.   Review of systems and history difficult to obtain because of patient's somnolence    PAST MEDICAL HISTORY   has a past medical history of Psychiatric disorder.      SURGICAL HISTORY   has a past surgical history that includes neck exploration (Left, 8/14/2015) and wound closure general (Left, 8/14/2015).      SOCIAL HISTORY  Social History   Substance Use Topics   • Smoking status: Current Every Day Smoker     Packs/day: 1.00     Types: Cigarettes        • Alcohol use Yes      Comment: " "1 pint daily      History   Drug Use No     Comment: Meth       FAMILY HISTORY  None noted       CURRENT MEDICATIONS  Home Medications    **Home medications have not yet been reviewed for this encounter**         ALLERGIES  No Known Allergies        PHYSICAL EXAM  VITAL SIGNS: /93   Pulse 78   Temp 36.3 °C (97.4 °F)   Resp 16   Ht 1.803 m (5' 11\")   Wt 69.5 kg (153 lb 3.5 oz)   SpO2 98%   BMI 21.37 kg/m²   Constitutional: Well developed, Well nourished, No acute distress, Non-toxic appearance. Sleepy but arousable  HEENT: Normocephalic, Atraumatic,  external ears normal, pharynx pink,  Mucous Membranes dry, No rhinorrhea or mucosal edema  Eyes: PERRL, EOMI, Conjunctiva normal, No discharge.   Neck: Normal range of motion, No tenderness, Supple, No stridor.   Lymphatic: No lymphadenopathy    Cardiovascular: Regular Rate and Rhythm, No murmurs,  rubs, or gallops.   Thorax & Lungs: Lungs clear to auscultation bilaterally, No respiratory distress, No wheezes, rhales or rhonchi, No chest wall tenderness.   Abdomen: Bowel sounds normal, Soft, non tender, non distended,  No pulsatile masses., no rebound guarding or peritoneal signs.   Skin: Warm, Dry, No erythema, No rash, No jaundice.   Extremities: Equal, intact distal pulses, No cyanosis, No tenderness.   Musculoskeletal: Good range of motion in all major joints. No tenderness to palpation or major deformities noted.   Neurologic: Somnolent but awake, Falls asleep during exam, no focal deficits  Psychiatric: Affect normal        DIAGNOSTIC STUDIES / PROCEDURES  LABS  Results for orders placed or performed during the hospital encounter of 01/14/18   CBC WITH DIFFERENTIAL   Result Value Ref Range    WBC 12.4 (H) 4.8 - 10.8 K/uL    RBC 5.75 4.70 - 6.10 M/uL    Hemoglobin 18.5 (H) 14.0 - 18.0 g/dL    Hematocrit 51.3 42.0 - 52.0 %    MCV 89.2 81.4 - 97.8 fL    MCH 32.2 27.0 - 33.0 pg    MCHC 36.1 (H) 33.7 - 35.3 g/dL    RDW 39.8 35.9 - 50.0 fL    Platelet Count " 315 164 - 446 K/uL    MPV 9.8 9.0 - 12.9 fL    Neutrophils-Polys 75.60 (H) 44.00 - 72.00 %    Lymphocytes 15.00 (L) 22.00 - 41.00 %    Monocytes 8.50 0.00 - 13.40 %    Eosinophils 0.20 0.00 - 6.90 %    Basophils 0.30 0.00 - 1.80 %    Immature Granulocytes 0.40 0.00 - 0.90 %    Nucleated RBC 0.00 /100 WBC    Neutrophils (Absolute) 9.37 (H) 1.82 - 7.42 K/uL    Lymphs (Absolute) 1.86 1.00 - 4.80 K/uL    Monos (Absolute) 1.06 (H) 0.00 - 0.85 K/uL    Eos (Absolute) 0.02 0.00 - 0.51 K/uL    Baso (Absolute) 0.04 0.00 - 0.12 K/uL    Immature Granulocytes (abs) 0.05 0.00 - 0.11 K/uL    NRBC (Absolute) 0.00 K/uL   COMP METABOLIC PANEL   Result Value Ref Range    Sodium 138 135 - 145 mmol/L    Potassium 3.8 3.6 - 5.5 mmol/L    Chloride 98 96 - 112 mmol/L    Co2 31 20 - 33 mmol/L    Anion Gap 9.0 0.0 - 11.9    Glucose 119 (H) 65 - 99 mg/dL    Bun 12 8 - 22 mg/dL    Creatinine 0.80 0.50 - 1.40 mg/dL    Calcium 9.7 8.5 - 10.5 mg/dL    AST(SGOT) 16 12 - 45 U/L    ALT(SGPT) 24 2 - 50 U/L    Alkaline Phosphatase 103 (H) 30 - 99 U/L    Total Bilirubin 0.9 0.1 - 1.5 mg/dL    Albumin 4.7 3.2 - 4.9 g/dL    Total Protein 7.3 6.0 - 8.2 g/dL    Globulin 2.6 1.9 - 3.5 g/dL    A-G Ratio 1.8 g/dL   LIPASE   Result Value Ref Range    Lipase 19 11 - 82 U/L   PROTHROMBIN TIME   Result Value Ref Range    PT 12.7 12.0 - 14.6 sec    INR 0.98 0.87 - 1.13   APTT   Result Value Ref Range    APTT 26.8 24.7 - 36.0 sec   DIAGNOSTIC ALCOHOL   Result Value Ref Range    Diagnostic Alcohol 0.00 0.00 g/dL   URINE DRUG SCREEN   Result Value Ref Range    Amphetamines Urine Positive (A) Negative    Barbiturates Negative Negative    Benzodiazepines Negative Negative    Cocaine Metabolite Negative Negative    Methadone Negative Negative    Opiates Negative Negative    Oxycodone Negative Negative    Phencyclidine -Pcp Negative Negative    Propoxyphene Negative Negative    Cannabinoid Metab Positive (A) Negative   AMMONIA   Result Value Ref Range    Ammonia 33 11 -  45 umol/L   ESTIMATED GFR   Result Value Ref Range    GFR If African American >60 >60 mL/min/1.73 m 2    GFR If Non African American >60 >60 mL/min/1.73 m 2   URINALYSIS,CULTURE IF INDICATED   Result Value Ref Range    Color DK Yellow     Character Cloudy (A)     Specific Gravity 1.023 <1.035    Ph 8.5 (A) 5.0 - 8.0    Glucose Negative Negative mg/dL    Ketones 40 (A) Negative mg/dL    Protein Negative Negative mg/dL    Bilirubin Negative Negative    Urobilinogen, Urine 1.0 Negative    Nitrite Negative Negative    Leukocyte Esterase Negative Negative    Occult Blood Negative Negative    Micro Urine Req Microscopic     Culture Indicated No UA Culture   URINE MICROSCOPIC (W/UA)   Result Value Ref Range    WBC 0-2 (A) /hpf    RBC 0-2 (A) /hpf    Bacteria Negative None /hpf    Epithelial Cells Few /hpf    Hyaline Cast 0-2 /lpf   All labs reviewed by me.      COURSE & MEDICAL DECISION MAKING  Nursing notes, VS, PMSFHx reviewed in chart.     Differential diagnoses include but not limited to: Pancreatitis, alcoholic gastritis, dehydration, elevated ammonia    8:54 AM - Patient seen and examined at bedside. Patient will be treated with Toradol 30 mg, Zofran 4 mg, IV fluids secondary to dry mucous membranes, and GI cocktail 30 mL. Ordered urinalysis, urine microscopic, estimated GFR, CMP, CBC, lipase, PTT, APTT, diagnostic alcohol, ammonia to evaluate his symptoms.     11:29 AM Patient reevaluated at bedside. Patient is resting comfortably and reports improvement of his symptoms following IV fluids. Discussed diagnostic results with the patient. He agrees to discharge home after discussion of supportive care for home. Patient encouraged to drink lots of fluids and follow up with primary care. The patient will return for new or worsening symptoms and is stable at the time of discharge.      DISPOSITION:  Patient will be discharged home in stable condition.      FOLLOW UP:  [address]  [place] [address]  [phone]  Call in 1 day  to establish care    [facility], Emergency Dept  [address] [address] [phone]    As needed, If symptoms worsen        OUTPATIENT MEDICATIONS:  Discharge Medication List as of 1/14/2018 11:52 AM      START taking these medications    Details   ondansetron (ZOFRAN ODT) 4 MG TABLET DISPERSIBLE Take 1 Tab by mouth every 6 hours as needed for Nausea., Disp-10 Tab, R-0, Normal               FINAL IMPRESSION  1. Epigastric pain    2. Polysubstance abuse           Jennifer TARIQ (Scribe), am scribing for, and in the presence of, Sharon Rubio M.D..  Electronically signed by: Jennifer Chambers (Jeisonibe), 1/14/2018  Sharon TARIQ M.D. personally performed the services described in this documentation, as scribed by Jennifer Chambers in my presence, and it is both accurate and complete.    The note accurately reflects work and decisions made by me.  Sharon Rubio  1/14/2018  4:31 PM

## 2018-01-14 NOTE — ED NOTES
PIV removed.  Patient given discharge instructions and follow up information, verbalized understanding, prescription x 1 electronically sent to pharmacy, location verified with patient.

## 2018-01-14 NOTE — ED NOTES
.  Chief Complaint   Patient presents with   • Flu Like Symptoms     amb to triage, reports n/v. fever h/a, aching x 3 days.  Unable to fluids down.  taking advil for sympt.  educated in triage, mask placed on pt, givn extra emesis bag, actively vomiting in triage.  returned to lobby.

## 2018-01-14 NOTE — DISCHARGE INSTRUCTIONS
Abdominal Pain, Adult  Many things can cause belly (abdominal) pain. Most times, the belly pain is not dangerous. Many cases of belly pain can be watched and treated at home.  HOME CARE   · Do not take medicines that help you go poop (laxatives) unless told to by your doctor.  · Only take medicine as told by your doctor.  · Eat or drink as told by your doctor. Your doctor will tell you if you should be on a special diet.  GET HELP IF:  · You do not know what is causing your belly pain.  · You have belly pain while you are sick to your stomach (nauseous) or have runny poop (diarrhea).  · You have pain while you pee or poop.  · Your belly pain wakes you up at night.  · You have belly pain that gets worse or better when you eat.  · You have belly pain that gets worse when you eat fatty foods.  · You have a fever.  GET HELP RIGHT AWAY IF:   · The pain does not go away within 2 hours.  · You keep throwing up (vomiting).  · The pain changes and is only in the right or left part of the belly.  · You have bloody or tarry looking poop.  MAKE SURE YOU:   · Understand these instructions.  · Will watch your condition.  · Will get help right away if you are not doing well or get worse.     This information is not intended to replace advice given to you by your health care provider. Make sure you discuss any questions you have with your health care provider.     Document Released: 06/05/2009 Document Revised: 01/08/2016 Document Reviewed: 08/27/2014  [employer] Interactive Patient Education ©2016 [employer]    Drug Abuse, Frequently Asked Questions  Drug addiction is a complex brain disease. It is characterized by compulsive, at times uncontrollable, drug craving, seeking, and use that persists even in the face of extremely negative results. Drug seeking becomes compulsive, in large part as a result of the effects of prolonged drug use on brain functioning and, thus, on behavior. For many people, drug addiction becomes chronic,  with relapses possible even after long periods of being off the drug.  HOW QUICKLY CAN I BECOME ADDICTED TO A DRUG?  There is no easy answer to this. If and how quickly you might become addicted to a drug depends on many factors including the biology of your body. All drugs are potentially harmful and may have life-threatening consequences associated with their use. There are also vast differences among individuals in sensitivity to various drugs. While one person may use a drug many times and suffer no ill effects, another person may be particularly vulnerable and overdose or developing a craving with the first use. There is no way of knowing in advance how someone may react.  HOW DO I KNOW IF SOMEONE IS ADDICTED TO DRUGS?  If a person is compulsively seeking and using a drug despite negative consequences (such as loss of job, debt, physical problems brought on by drug abuse, or family problems) then he or she is probably addicted. Those who screen for drug problems, such as physicians, have developed the CAGE questionnaire. These four simple questions can help detect substance abuse problems:  · Have you ever felt you ought to Cut down on your drinking/drug use?   · Have people ever Annoyed you by criticizing your drinking/drug use?   · Have you ever felt bad or Guilty about your drinking/drug use?   · Have you ever had a drink or taken a drug first thing in the morning to steady your nerves or get rid of a hangover (Eye-opener)?   WHAT ARE THE PHYSICAL SIGNS OF ABUSE OR ADDICTION?  The physical signs of abuse or addiction can vary depending on the person and the drug being abused. For example, someone who abuses marijuana may have a chronic cough or worsening of asthmatic conditions. THC, the chemical in marijuana responsible for producing its effects, is associated with weakening the immune system which makes the user more vulnerable to infections, such as pneumonia. Each drug has short-term and long-term  "physical effects. Stimulants like cocaine increase heart rate and blood pressure, whereas opioids like heroin may slow the heart rate and reduce breathing (respiration).   ARE THERE EFFECTIVE TREATMENTS FOR DRUG ADDICTION?  Drug addiction can be effectively treated with behavioral-based therapies and, for addiction to some drugs such as heroin or nicotine, medications may be used. Treatment may vary for each person depending on the type of drug(s) being used and multiple courses of treatment may be needed to achieve success. Research has revealed 13 basic principles that underlie effective drug addiction treatment. These are discussed in [employer]'s Principles of Drug Addiction Treatment: A Research-Based Guide.  WHERE CAN I FIND INFORMATION ABOUT DRUG TREATMENT PROGRAMS?  · For referrals to treatment programs, visit the Substance Abuse and Mental Health Services Administration online at http://findtreatment.sama.gov/.   · [employer] publishes an expanding series of treatment manuals, the \"clinical toolbox,\" that gives drug treatment providers research-based information for creating effective treatment programs.   WHAT IS DETOXIFICATION, OR \"DETOX\"?  Detoxification is the process of allowing the body to rid itself of a drug while managing the symptoms of withdrawal. It is often the first step in a drug treatment program and should be followed by treatment with a behavioral-based therapy and/or a medication, if available. Detox alone with no follow-up is not treatment.   WHAT IS WITHDRAWAL? HOW LONG DOES IT LAST?  Withdrawal is the variety of symptoms that occur after use of some addictive drugs is reduced or stopped. Length of withdrawal and symptoms vary with the type of drug. For example, physical symptoms of heroin withdrawal may include restlessness, muscle and bone pain, insomnia, diarrhea, vomiting, and cold flashes. These physical symptoms may last for several days, but the general depression, or dysphoria (opposite " of euphoria), that often accompanies heroin withdrawal, may last for weeks. In many cases withdrawal can be easily treated with medications to ease the symptoms. But treating withdrawal is not the same as treating addiction.   WHAT ARE THE COSTS OF DRUG ABUSE TO SOCIETY?  Beyond the raw numbers are other costs to society:  · Spread of infectious diseases such as HIV/AIDS and hepatitis C either through sharing of drug paraphernalia or unprotected sex.   · Deaths due to overdose or other complications from drug use.   · Effects on unborn children of pregnant drug users.   · Other effects such as crime and homelessness.   IF A PREGNANT WOMAN ABUSES DRUGS, DOES IT AFFECT THE FETUS?  · Many substances including alcohol, nicotine, and drugs of abuse can have negative effects on the developing fetus because they are transferred to the fetus across the placenta. For example, nicotine has been connected with premature birth and low birth weight, as has the use of cocaine. Scientific studies have shown that babies born to marijuana users were shorter, weighed less, and had smaller head sizes than those born to mothers who did not use the drug. Smaller babies are more likely to develop health problems.   · Whether a baby's health problems, if caused by a drug, will continue as the child grows, is not always known. Research does show that children born to mothers who used marijuana regularly during pregnancy may have trouble concentrating, when older. Our research continues to produce insights on the negative effects of drug use on the fetus.   Document Released: 12/20/2004 Document Revised: 03/11/2013 Document Reviewed: 03/19/2010  "[employer]Care® Patient Information ©2013 [employer].

## 2018-04-25 ENCOUNTER — APPOINTMENT (OUTPATIENT)
Dept: RADIOLOGY | Facility: MEDICAL CENTER | Age: 33
End: 2018-04-25
Attending: EMERGENCY MEDICINE
Payer: MEDICAID

## 2018-04-25 ENCOUNTER — HOSPITAL ENCOUNTER (OUTPATIENT)
Facility: MEDICAL CENTER | Age: 33
End: 2018-04-27
Attending: EMERGENCY MEDICINE | Admitting: HOSPITALIST
Payer: MEDICAID

## 2018-04-25 LAB
ALBUMIN SERPL BCP-MCNC: 3.8 G/DL (ref 3.2–4.9)
ALBUMIN/GLOB SERPL: 1.3 G/DL
ALP SERPL-CCNC: 80 U/L (ref 30–99)
ALT SERPL-CCNC: 10 U/L (ref 2–50)
ANION GAP SERPL CALC-SCNC: 9 MMOL/L (ref 0–11.9)
AST SERPL-CCNC: 11 U/L (ref 12–45)
BASOPHILS # BLD AUTO: 0.4 % (ref 0–1.8)
BASOPHILS # BLD: 0.04 K/UL (ref 0–0.12)
BILIRUB SERPL-MCNC: 0.3 MG/DL (ref 0.1–1.5)
BUN SERPL-MCNC: 5 MG/DL (ref 8–22)
CALCIUM SERPL-MCNC: 9.1 MG/DL (ref 8.5–10.5)
CHLORIDE SERPL-SCNC: 104 MMOL/L (ref 96–112)
CO2 SERPL-SCNC: 25 MMOL/L (ref 20–33)
CREAT SERPL-MCNC: 0.69 MG/DL (ref 0.5–1.4)
EKG IMPRESSION: NORMAL
EOSINOPHIL # BLD AUTO: 0.15 K/UL (ref 0–0.51)
EOSINOPHIL NFR BLD: 1.3 % (ref 0–6.9)
ERYTHROCYTE [DISTWIDTH] IN BLOOD BY AUTOMATED COUNT: 39.9 FL (ref 35.9–50)
GLOBULIN SER CALC-MCNC: 2.9 G/DL (ref 1.9–3.5)
GLUCOSE SERPL-MCNC: 109 MG/DL (ref 65–99)
HCT VFR BLD AUTO: 42.6 % (ref 42–52)
HGB BLD-MCNC: 14.9 G/DL (ref 14–18)
IMM GRANULOCYTES # BLD AUTO: 0.04 K/UL (ref 0–0.11)
IMM GRANULOCYTES NFR BLD AUTO: 0.4 % (ref 0–0.9)
LIPASE SERPL-CCNC: 10 U/L (ref 11–82)
LYMPHOCYTES # BLD AUTO: 2.91 K/UL (ref 1–4.8)
LYMPHOCYTES NFR BLD: 26.1 % (ref 22–41)
MCH RBC QN AUTO: 31.4 PG (ref 27–33)
MCHC RBC AUTO-ENTMCNC: 35 G/DL (ref 33.7–35.3)
MCV RBC AUTO: 89.9 FL (ref 81.4–97.8)
MONOCYTES # BLD AUTO: 1.08 K/UL (ref 0–0.85)
MONOCYTES NFR BLD AUTO: 9.7 % (ref 0–13.4)
NEUTROPHILS # BLD AUTO: 6.91 K/UL (ref 1.82–7.42)
NEUTROPHILS NFR BLD: 62.1 % (ref 44–72)
NRBC # BLD AUTO: 0 K/UL
NRBC BLD-RTO: 0 /100 WBC
PLATELET # BLD AUTO: 274 K/UL (ref 164–446)
PMV BLD AUTO: 9.2 FL (ref 9–12.9)
POTASSIUM SERPL-SCNC: 3.4 MMOL/L (ref 3.6–5.5)
PROT SERPL-MCNC: 6.7 G/DL (ref 6–8.2)
RBC # BLD AUTO: 4.74 M/UL (ref 4.7–6.1)
SODIUM SERPL-SCNC: 138 MMOL/L (ref 135–145)
WBC # BLD AUTO: 11.1 K/UL (ref 4.8–10.8)

## 2018-04-25 PROCEDURE — 80053 COMPREHEN METABOLIC PANEL: CPT

## 2018-04-25 PROCEDURE — 700105 HCHG RX REV CODE 258: Performed by: EMERGENCY MEDICINE

## 2018-04-25 PROCEDURE — 74177 CT ABD & PELVIS W/CONTRAST: CPT

## 2018-04-25 PROCEDURE — 96374 THER/PROPH/DIAG INJ IV PUSH: CPT

## 2018-04-25 PROCEDURE — 93005 ELECTROCARDIOGRAM TRACING: CPT

## 2018-04-25 PROCEDURE — 700111 HCHG RX REV CODE 636 W/ 250 OVERRIDE (IP): Performed by: EMERGENCY MEDICINE

## 2018-04-25 PROCEDURE — 700102 HCHG RX REV CODE 250 W/ 637 OVERRIDE(OP): Performed by: EMERGENCY MEDICINE

## 2018-04-25 PROCEDURE — 83690 ASSAY OF LIPASE: CPT

## 2018-04-25 PROCEDURE — 84484 ASSAY OF TROPONIN QUANT: CPT

## 2018-04-25 PROCEDURE — A9270 NON-COVERED ITEM OR SERVICE: HCPCS | Performed by: EMERGENCY MEDICINE

## 2018-04-25 PROCEDURE — 96361 HYDRATE IV INFUSION ADD-ON: CPT

## 2018-04-25 PROCEDURE — 93005 ELECTROCARDIOGRAM TRACING: CPT | Performed by: EMERGENCY MEDICINE

## 2018-04-25 PROCEDURE — 99285 EMERGENCY DEPT VISIT HI MDM: CPT

## 2018-04-25 PROCEDURE — 85025 COMPLETE CBC W/AUTO DIFF WBC: CPT

## 2018-04-25 PROCEDURE — 700117 HCHG RX CONTRAST REV CODE 255: Performed by: EMERGENCY MEDICINE

## 2018-04-25 PROCEDURE — 36415 COLL VENOUS BLD VENIPUNCTURE: CPT

## 2018-04-25 RX ORDER — ONDANSETRON 2 MG/ML
4 INJECTION INTRAMUSCULAR; INTRAVENOUS ONCE
Status: COMPLETED | OUTPATIENT
Start: 2018-04-25 | End: 2018-04-25

## 2018-04-25 RX ORDER — SODIUM CHLORIDE 9 MG/ML
1000 INJECTION, SOLUTION INTRAVENOUS ONCE
Status: COMPLETED | OUTPATIENT
Start: 2018-04-25 | End: 2018-04-25

## 2018-04-25 RX ORDER — ALUMINA, MAGNESIA, AND SIMETHICONE 2400; 2400; 240 MG/30ML; MG/30ML; MG/30ML
10 SUSPENSION ORAL ONCE
Status: COMPLETED | OUTPATIENT
Start: 2018-04-25 | End: 2018-04-25

## 2018-04-25 RX ADMIN — IOHEXOL 100 ML: 350 INJECTION, SOLUTION INTRAVENOUS at 23:43

## 2018-04-25 RX ADMIN — ONDANSETRON HYDROCHLORIDE 4 MG: 2 INJECTION, SOLUTION INTRAMUSCULAR; INTRAVENOUS at 22:59

## 2018-04-25 RX ADMIN — SODIUM CHLORIDE 1000 ML: 9 INJECTION, SOLUTION INTRAVENOUS at 23:03

## 2018-04-25 RX ADMIN — ALUMINUM HYDROXIDE, MAGNESIUM HYDROXIDE,SIMETHICONE 10 ML: 400; 400; 40 LIQUID ORAL at 23:02

## 2018-04-25 ASSESSMENT — PAIN SCALES - GENERAL
PAINLEVEL_OUTOF10: 7
PAINLEVEL_OUTOF10: 10
PAINLEVEL_OUTOF10: 7

## 2018-04-26 ENCOUNTER — APPOINTMENT (OUTPATIENT)
Dept: RADIOLOGY | Facility: MEDICAL CENTER | Age: 33
End: 2018-04-26
Attending: STUDENT IN AN ORGANIZED HEALTH CARE EDUCATION/TRAINING PROGRAM
Payer: MEDICAID

## 2018-04-26 PROBLEM — R00.0 TACHYCARDIA: Status: ACTIVE | Noted: 2018-04-26

## 2018-04-26 PROBLEM — F19.10 SUBSTANCE ABUSE (HCC): Status: ACTIVE | Noted: 2018-04-26

## 2018-04-26 PROBLEM — R41.82 ALTERED MENTAL STATUS, UNSPECIFIED: Status: ACTIVE | Noted: 2018-04-26

## 2018-04-26 PROBLEM — Z59.00 HOMELESS: Status: ACTIVE | Noted: 2018-04-26

## 2018-04-26 PROBLEM — R10.13 EPIGASTRIC PAIN: Status: ACTIVE | Noted: 2018-04-26

## 2018-04-26 PROBLEM — Z86.59 HISTORY OF PSYCHIATRIC DISORDER: Status: ACTIVE | Noted: 2018-04-26

## 2018-04-26 LAB
ALBUMIN SERPL BCP-MCNC: 3.6 G/DL (ref 3.2–4.9)
ALBUMIN/GLOB SERPL: 1.3 G/DL
ALP SERPL-CCNC: 95 U/L (ref 30–99)
ALT SERPL-CCNC: 11 U/L (ref 2–50)
AMMONIA PLAS-SCNC: 41 UMOL/L (ref 11–45)
AMPHET UR QL SCN: NEGATIVE
AMYLASE SERPL-CCNC: 37 U/L (ref 20–103)
ANION GAP SERPL CALC-SCNC: 6 MMOL/L (ref 0–11.9)
APAP SERPL-MCNC: <10 UG/ML (ref 10–30)
APPEARANCE UR: CLEAR
AST SERPL-CCNC: 12 U/L (ref 12–45)
BARBITURATES UR QL SCN: NEGATIVE
BASOPHILS # BLD AUTO: 0.3 % (ref 0–1.8)
BASOPHILS # BLD: 0.04 K/UL (ref 0–0.12)
BENZODIAZ UR QL SCN: NEGATIVE
BILIRUB SERPL-MCNC: 0.6 MG/DL (ref 0.1–1.5)
BUN SERPL-MCNC: 6 MG/DL (ref 8–22)
BZE UR QL SCN: NEGATIVE
CALCIUM SERPL-MCNC: 8.6 MG/DL (ref 8.5–10.5)
CANNABINOIDS UR QL SCN: NEGATIVE
CHLORIDE SERPL-SCNC: 106 MMOL/L (ref 96–112)
CK SERPL-CCNC: 61 U/L (ref 0–154)
CO2 SERPL-SCNC: 24 MMOL/L (ref 20–33)
COLOR UR AUTO: YELLOW
CREAT SERPL-MCNC: 0.73 MG/DL (ref 0.5–1.4)
EKG IMPRESSION: NORMAL
EOSINOPHIL # BLD AUTO: 0.03 K/UL (ref 0–0.51)
EOSINOPHIL NFR BLD: 0.2 % (ref 0–6.9)
ERYTHROCYTE [DISTWIDTH] IN BLOOD BY AUTOMATED COUNT: 38.8 FL (ref 35.9–50)
EST. AVERAGE GLUCOSE BLD GHB EST-MCNC: 103 MG/DL
ETHANOL BLD-MCNC: 0.01 G/DL
GLOBULIN SER CALC-MCNC: 2.8 G/DL (ref 1.9–3.5)
GLUCOSE SERPL-MCNC: 99 MG/DL (ref 65–99)
GLUCOSE UR QL STRIP.AUTO: NEGATIVE MG/DL
HBA1C MFR BLD: 5.2 % (ref 0–5.6)
HCT VFR BLD AUTO: 43 % (ref 42–52)
HGB BLD-MCNC: 15.5 G/DL (ref 14–18)
IMM GRANULOCYTES # BLD AUTO: 0.06 K/UL (ref 0–0.11)
IMM GRANULOCYTES NFR BLD AUTO: 0.5 % (ref 0–0.9)
KETONES UR QL STRIP.AUTO: NEGATIVE MG/DL
LDH SERPL L TO P-CCNC: 153 U/L (ref 107–266)
LEUKOCYTE ESTERASE UR QL STRIP.AUTO: NEGATIVE
LYMPHOCYTES # BLD AUTO: 2.1 K/UL (ref 1–4.8)
LYMPHOCYTES NFR BLD: 16.8 % (ref 22–41)
MAGNESIUM SERPL-MCNC: 1.8 MG/DL (ref 1.5–2.5)
MAGNESIUM SERPL-MCNC: 1.9 MG/DL (ref 1.5–2.5)
MCH RBC QN AUTO: 31.7 PG (ref 27–33)
MCHC RBC AUTO-ENTMCNC: 36 G/DL (ref 33.7–35.3)
MCV RBC AUTO: 87.9 FL (ref 81.4–97.8)
METHADONE UR QL SCN: NEGATIVE
MONOCYTES # BLD AUTO: 1.23 K/UL (ref 0–0.85)
MONOCYTES NFR BLD AUTO: 9.8 % (ref 0–13.4)
NEUTROPHILS # BLD AUTO: 9.06 K/UL (ref 1.82–7.42)
NEUTROPHILS NFR BLD: 72.4 % (ref 44–72)
NITRITE UR QL STRIP.AUTO: NEGATIVE
NRBC # BLD AUTO: 0 K/UL
NRBC BLD-RTO: 0 /100 WBC
OPIATES UR QL SCN: NEGATIVE
OXYCODONE UR QL SCN: NEGATIVE
PCP UR QL SCN: NEGATIVE
PH UR STRIP.AUTO: 7 [PH]
PLATELET # BLD AUTO: 272 K/UL (ref 164–446)
PMV BLD AUTO: 9.2 FL (ref 9–12.9)
POTASSIUM SERPL-SCNC: 4.1 MMOL/L (ref 3.6–5.5)
PROPOXYPH UR QL SCN: NEGATIVE
PROT SERPL-MCNC: 6.4 G/DL (ref 6–8.2)
PROT UR QL STRIP: NEGATIVE MG/DL
RBC # BLD AUTO: 4.89 M/UL (ref 4.7–6.1)
RBC UR QL AUTO: ABNORMAL
SALICYLATES SERPL-MCNC: 0 MG/DL (ref 15–25)
SODIUM SERPL-SCNC: 136 MMOL/L (ref 135–145)
SP GR UR: 1.01
TROPONIN I SERPL-MCNC: <0.01 NG/ML (ref 0–0.04)
TROPONIN I SERPL-MCNC: <0.01 NG/ML (ref 0–0.04)
TSH SERPL DL<=0.005 MIU/L-ACNC: 0.88 UIU/ML (ref 0.38–5.33)
WBC # BLD AUTO: 12.5 K/UL (ref 4.8–10.8)

## 2018-04-26 PROCEDURE — 83036 HEMOGLOBIN GLYCOSYLATED A1C: CPT

## 2018-04-26 PROCEDURE — 82150 ASSAY OF AMYLASE: CPT

## 2018-04-26 PROCEDURE — 85025 COMPLETE CBC W/AUTO DIFF WBC: CPT

## 2018-04-26 PROCEDURE — 83735 ASSAY OF MAGNESIUM: CPT

## 2018-04-26 PROCEDURE — 700105 HCHG RX REV CODE 258: Performed by: STUDENT IN AN ORGANIZED HEALTH CARE EDUCATION/TRAINING PROGRAM

## 2018-04-26 PROCEDURE — 81002 URINALYSIS NONAUTO W/O SCOPE: CPT

## 2018-04-26 PROCEDURE — 96372 THER/PROPH/DIAG INJ SC/IM: CPT

## 2018-04-26 PROCEDURE — 84484 ASSAY OF TROPONIN QUANT: CPT

## 2018-04-26 PROCEDURE — 700102 HCHG RX REV CODE 250 W/ 637 OVERRIDE(OP): Performed by: STUDENT IN AN ORGANIZED HEALTH CARE EDUCATION/TRAINING PROGRAM

## 2018-04-26 PROCEDURE — G0378 HOSPITAL OBSERVATION PER HR: HCPCS

## 2018-04-26 PROCEDURE — 96375 TX/PRO/DX INJ NEW DRUG ADDON: CPT

## 2018-04-26 PROCEDURE — 84443 ASSAY THYROID STIM HORMONE: CPT

## 2018-04-26 PROCEDURE — 700105 HCHG RX REV CODE 258: Performed by: EMERGENCY MEDICINE

## 2018-04-26 PROCEDURE — 82550 ASSAY OF CK (CPK): CPT

## 2018-04-26 PROCEDURE — A9270 NON-COVERED ITEM OR SERVICE: HCPCS | Performed by: STUDENT IN AN ORGANIZED HEALTH CARE EDUCATION/TRAINING PROGRAM

## 2018-04-26 PROCEDURE — 93005 ELECTROCARDIOGRAM TRACING: CPT | Performed by: STUDENT IN AN ORGANIZED HEALTH CARE EDUCATION/TRAINING PROGRAM

## 2018-04-26 PROCEDURE — 36415 COLL VENOUS BLD VENIPUNCTURE: CPT

## 2018-04-26 PROCEDURE — 99220 PR INITIAL OBSERVATION CARE,LEVL III: CPT | Mod: GC | Performed by: HOSPITALIST

## 2018-04-26 PROCEDURE — 700111 HCHG RX REV CODE 636 W/ 250 OVERRIDE (IP): Performed by: STUDENT IN AN ORGANIZED HEALTH CARE EDUCATION/TRAINING PROGRAM

## 2018-04-26 PROCEDURE — 80307 DRUG TEST PRSMV CHEM ANLYZR: CPT

## 2018-04-26 PROCEDURE — 70450 CT HEAD/BRAIN W/O DYE: CPT

## 2018-04-26 PROCEDURE — 96361 HYDRATE IV INFUSION ADD-ON: CPT

## 2018-04-26 PROCEDURE — 700111 HCHG RX REV CODE 636 W/ 250 OVERRIDE (IP): Performed by: EMERGENCY MEDICINE

## 2018-04-26 PROCEDURE — 80053 COMPREHEN METABOLIC PANEL: CPT

## 2018-04-26 PROCEDURE — 93010 ELECTROCARDIOGRAM REPORT: CPT | Performed by: INTERNAL MEDICINE

## 2018-04-26 PROCEDURE — 83615 LACTATE (LD) (LDH) ENZYME: CPT

## 2018-04-26 PROCEDURE — 82140 ASSAY OF AMMONIA: CPT

## 2018-04-26 RX ORDER — LORAZEPAM 2 MG/ML
2 INJECTION INTRAMUSCULAR ONCE
Status: COMPLETED | OUTPATIENT
Start: 2018-04-26 | End: 2018-04-26

## 2018-04-26 RX ORDER — BISACODYL 10 MG
10 SUPPOSITORY, RECTAL RECTAL
Status: DISCONTINUED | OUTPATIENT
Start: 2018-04-26 | End: 2018-04-27 | Stop reason: HOSPADM

## 2018-04-26 RX ORDER — AMOXICILLIN 250 MG
1 CAPSULE ORAL 2 TIMES DAILY
Status: DISCONTINUED | OUTPATIENT
Start: 2018-04-26 | End: 2018-04-27 | Stop reason: HOSPADM

## 2018-04-26 RX ORDER — POLYETHYLENE GLYCOL 3350 17 G/17G
1 POWDER, FOR SOLUTION ORAL
Status: DISCONTINUED | OUTPATIENT
Start: 2018-04-26 | End: 2018-04-27 | Stop reason: HOSPADM

## 2018-04-26 RX ORDER — SODIUM CHLORIDE 9 MG/ML
1000 INJECTION, SOLUTION INTRAVENOUS ONCE
Status: COMPLETED | OUTPATIENT
Start: 2018-04-26 | End: 2018-04-26

## 2018-04-26 RX ORDER — POTASSIUM CHLORIDE 20 MEQ/1
40 TABLET, EXTENDED RELEASE ORAL ONCE
Status: COMPLETED | OUTPATIENT
Start: 2018-04-26 | End: 2018-04-26

## 2018-04-26 RX ORDER — OMEPRAZOLE 20 MG/1
20 CAPSULE, DELAYED RELEASE ORAL DAILY
Status: DISCONTINUED | OUTPATIENT
Start: 2018-04-26 | End: 2018-04-27 | Stop reason: HOSPADM

## 2018-04-26 RX ORDER — ONDANSETRON 2 MG/ML
4 INJECTION INTRAMUSCULAR; INTRAVENOUS EVERY 6 HOURS PRN
Status: DISCONTINUED | OUTPATIENT
Start: 2018-04-26 | End: 2018-04-27 | Stop reason: HOSPADM

## 2018-04-26 RX ORDER — SUCRALFATE ORAL 1 G/10ML
1 SUSPENSION ORAL EVERY 6 HOURS
Status: DISCONTINUED | OUTPATIENT
Start: 2018-04-26 | End: 2018-04-27 | Stop reason: HOSPADM

## 2018-04-26 RX ORDER — SODIUM CHLORIDE 9 MG/ML
INJECTION, SOLUTION INTRAVENOUS CONTINUOUS
Status: DISCONTINUED | OUTPATIENT
Start: 2018-04-26 | End: 2018-04-27 | Stop reason: HOSPADM

## 2018-04-26 RX ORDER — FAMOTIDINE 20 MG/1
40 TABLET, FILM COATED ORAL DAILY
Status: DISCONTINUED | OUTPATIENT
Start: 2018-04-26 | End: 2018-04-26

## 2018-04-26 RX ADMIN — SODIUM CHLORIDE 1000 ML: 9 INJECTION, SOLUTION INTRAVENOUS at 00:20

## 2018-04-26 RX ADMIN — OMEPRAZOLE 20 MG: 20 CAPSULE, DELAYED RELEASE ORAL at 17:33

## 2018-04-26 RX ADMIN — ENOXAPARIN SODIUM 40 MG: 100 INJECTION SUBCUTANEOUS at 10:22

## 2018-04-26 RX ADMIN — SODIUM CHLORIDE: 9 INJECTION, SOLUTION INTRAVENOUS at 05:42

## 2018-04-26 RX ADMIN — LORAZEPAM 2 MG: 2 INJECTION INTRAMUSCULAR; INTRAVENOUS at 00:54

## 2018-04-26 RX ADMIN — FAMOTIDINE 40 MG: 20 TABLET, FILM COATED ORAL at 10:21

## 2018-04-26 RX ADMIN — SODIUM CHLORIDE: 9 INJECTION, SOLUTION INTRAVENOUS at 13:50

## 2018-04-26 RX ADMIN — SUCRALFATE 1 G: 1 SUSPENSION ORAL at 17:33

## 2018-04-26 RX ADMIN — POTASSIUM CHLORIDE 40 MEQ: 1500 TABLET, EXTENDED RELEASE ORAL at 05:42

## 2018-04-26 ASSESSMENT — ENCOUNTER SYMPTOMS
SENSORY CHANGE: 0
FEVER: 1
CHILLS: 0
SORE THROAT: 0
DIARRHEA: 1
ABDOMINAL PAIN: 1
TREMORS: 0
WHEEZING: 0
BLOOD IN STOOL: 0
SINUS PAIN: 0
FEVER: 0
HEADACHES: 0
SHORTNESS OF BREATH: 0
FOCAL WEAKNESS: 0
CONSTIPATION: 0
COUGH: 0
EYE DISCHARGE: 0
MYALGIAS: 0
DOUBLE VISION: 0
SHORTNESS OF BREATH: 1
EYE PAIN: 0
EYE REDNESS: 0
ORTHOPNEA: 0
BRUISES/BLEEDS EASILY: 0
STRIDOR: 0
BLURRED VISION: 0
NAUSEA: 1
VOMITING: 1
PALPITATIONS: 0

## 2018-04-26 ASSESSMENT — PAIN SCALES - GENERAL
PAINLEVEL_OUTOF10: 2
PAINLEVEL_OUTOF10: 0

## 2018-04-26 ASSESSMENT — LIFESTYLE VARIABLES: SUBSTANCE_ABUSE: 1

## 2018-04-26 NOTE — ASSESSMENT & PLAN NOTE
- homeless  - per pt, has family in [place]?, but pt with altered mentation / drowsy  - lives on street  -  Consult

## 2018-04-26 NOTE — H&P
Internal Medicine Admitting History and Physical    Name Justice Ortega       1985   Age/Sex 32 y.o. male   MRN [id number]   Code Status Full (unable to fully elaborate)     After 5PM or if no immediate response to page, please call for cross-coverage  Attending/Team: Dr. Jin /  Karli team See Patient List for primary contact information  Call [phone] to page    1st Call - Day Intern (R1):   Dr. Perez 2nd Call - Day Sr. Resident (R2/R3):   Dr. Cárdenas       Chief Complaint:  Epigastric pain, and tachycardia.      HPI:  Justice Ortega is a 32 y.o. male who presents for epigastric pain for the past 3 days.  Additionally, he has noted nausea, vomiting, and diarrhea.  He states that he is tried using Motrin and antacids, but has not been able to find relief from pain. He states that he has past history of pancreatitis, but states that that was previously due to alcohol use and drugs, however, he recently denies any use of alcohol or drugs.    Additionally, the evaluation is made difficult by the patient's current somnolent state. He initially presented to the ER for severe abdominal pain and tachycardia.  Therefore, the ER physicians gave him Ativan, and he has been very tired and snoring, by the time our team was called for evaluation.  It was difficult to arouse him to ask questions, but basic history was obtained from him, but somewhat limited, due to his current status (having to repeatedly use stimulus to have him respond).    Additionally, in his past chart review, he is previously used marijuana and amphetamines in the past. Her current drug test has not yet been performed, therefore it is uncertain whether or not he is on amphetamines at the present time. However, and that could explain the tachycardia. Additionally, he was previously noted to use alcohol on a daily basis; however, again he states that he has not used this recently.       ROS - unable to fully evaluate, given his current  condition.  However, below his evaluation that she was able to partially described, and his somnolent state.  Review of Systems   Constitutional: Positive for fever. Negative for chills and malaise/fatigue.   HENT: Negative for ear pain, hearing loss, sinus pain and sore throat.    Eyes: Negative for blurred vision, double vision and pain.   Respiratory: Positive for shortness of breath. Negative for cough, wheezing and stridor.    Cardiovascular: Positive for chest pain. Negative for palpitations, orthopnea and leg swelling.   Gastrointestinal: Positive for abdominal pain (epigastric pain-see history of present illness.), diarrhea, nausea and vomiting. Negative for blood in stool, constipation and melena.   Genitourinary: Negative for dysuria, frequency and hematuria.   Musculoskeletal: Negative for myalgias.   Skin: Negative for itching and rash.   Neurological: Negative for tremors and sensory change.   Endo/Heme/Allergies: Negative for environmental allergies. Does not bruise/bleed easily.   Psychiatric/Behavioral: Positive for substance abuse (in the past, but he denies current use.).        Patient is not alert at this time, and cannot fully elaborate his condition, as noted above. However, he is vague about past psychiatric history, but prior chart does note past psych disorder.            Past Medical History:   Past Medical History:   Diagnosis Date   • Psychiatric disorder        Past Surgical History:  Past Surgical History:   Procedure Laterality Date   • NECK EXPLORATION Left 8/14/2015    Procedure: NECK EXPLORATION;  Surgeon: Ugo Willoughby M.D.;  Location: SURGERY [facility];  Service:    • WOUND CLOSURE GENERAL Left 8/14/2015    Procedure: WOUND CLOSURE GENERAL;  Surgeon: Ugo Willoughby M.D.;  Location: SURGERY [facility] [place];  Service:        Family History:  History reviewed. No pertinent family history.    Social History:  Social History     Social History   • Marital status: Single      "Spouse name: N/A   • Number of children: N/A   • Years of education: N/A     Occupational History   • Not on file.     Social History Main Topics   • Smoking status: Current Every Day Smoker     Packs/day: 1.00     Years: 5.00     Types: Cigarettes   • Smokeless tobacco: Never Used   • Alcohol use Yes      Comment: Daily   • Drug use: No   • Sexual activity: Not on file     Other Topics Concern   • Not on file     Social History Narrative    ** Merged History Encounter **         ** Merged History Encounter **         ** Merged History Encounter **         ** Merged History Encounter **         ** Merged History Encounter **            Current Outpatient Medications:  Home Medications     Reviewed by oRma Khoury R.N. (Registered Nurse) on 04/25/18 at 2223  Med List Status: Complete   Medication Last Dose Status        Patient Julián Taking any Medications                   Denies palpitations.    Medication Allergy/Sensitivities:  No Known Allergies - denies allergies.        Physical Exam     Vitals:    04/26/18 0215 04/26/18 0230 04/26/18 0245 04/26/18 0300   BP:       Pulse: (!) 107 (!) 106 100 97   Resp: 18 15 15 20   Temp:       SpO2: 97% 95% 98% 94%   Weight:       Height:         Body mass index is 22.85 kg/m².  /91   Pulse 97   Temp 37.4 °C (99.4 °F)   Resp 20   Ht 1.803 m (5' 11\")   Wt 74.3 kg (163 lb 12.8 oz)   SpO2 94%   BMI 22.85 kg/m²   O2 therapy: Pulse Oximetry: 94 %, O2 Delivery: None (Room Air)    Physical Exam   Constitutional: No distress.   Thin male, with somewhat disheveled appearance. Currently sleeping and snoring, after having received Ativan.   HENT:   Head: Normocephalic and atraumatic.   Mouth/Throat: No oropharyngeal exudate.   Eyes: EOM are normal. Pupils are equal, round, and reactive to light. Right eye exhibits no discharge. Left eye exhibits no discharge. No scleral icterus.   Neck: No JVD present. No tracheal deviation present.   Cardiovascular: Exam reveals no " gallop and no friction rub.    No murmur heard.  Tachycardic rates approximately 110, with regular intervals to auscultation. There are was a slight delay between upper and lower extremity pulses.   Pulmonary/Chest: Effort normal and breath sounds normal. No stridor. No respiratory distress. He has no wheezes. He has no rales.   Abdominal: Soft. Bowel sounds are normal. He exhibits no distension. There is tenderness (tender to umbilical region, and right lower quadrant, and right upper quadrant.). There is no rebound and no guarding.   Musculoskeletal: He exhibits no edema or tenderness.   Poor foot hygiene. He had multiple bruising areas on his toes, mild skin peeling between the toes, and deformed toenails.   Neurological: He exhibits normal muscle tone.   Tired and somnolent after receiving Ativan.   Skin: Skin is warm and dry. No rash noted. He is not diaphoretic. No erythema.   Psychiatric:   Tired and somnolent after receiving Ativan.  At time of evaluation, it was difficult to arouse him, to get proper responses.       Data Review       Lab Data Review:  Recent Results (from the past 24 hour(s))   EKG (NOW)    Collection Time: 18  8:13 PM   Result Value Ref Range    Report       [facility]    Test Date:  2018  Pt Name:    EUGENE RUEDA                Department: ER  MRN:        [id number]                      Room:  Gender:     Male                         Technician: [id number]  :        1985                   Requested By:ER TRIAGE PROTOCOL  Order #:    [id number]                    Reading MD:    Measurements  Intervals                                Axis  Rate:       103                          P:          69  IA:         140                          QRS:        54  QRSD:       82                           T:          39  QT:         340  QTc:        445    Interpretive Statements  SINUS TACHYCARDIA  CONSIDER LEFT ATRIAL ABNORMALITY  Compared to ECG  10/25/2017 01:36:52  Sinus rhythm no longer present     CBC WITH DIFFERENTIAL    Collection Time: 04/25/18  8:27 PM   Result Value Ref Range    WBC 11.1 (H) 4.8 - 10.8 K/uL    RBC 4.74 4.70 - 6.10 M/uL    Hemoglobin 14.9 14.0 - 18.0 g/dL    Hematocrit 42.6 42.0 - 52.0 %    MCV 89.9 81.4 - 97.8 fL    MCH 31.4 27.0 - 33.0 pg    MCHC 35.0 33.7 - 35.3 g/dL    RDW 39.9 35.9 - 50.0 fL    Platelet Count 274 164 - 446 K/uL    MPV 9.2 9.0 - 12.9 fL    Neutrophils-Polys 62.10 44.00 - 72.00 %    Lymphocytes 26.10 22.00 - 41.00 %    Monocytes 9.70 0.00 - 13.40 %    Eosinophils 1.30 0.00 - 6.90 %    Basophils 0.40 0.00 - 1.80 %    Immature Granulocytes 0.40 0.00 - 0.90 %    Nucleated RBC 0.00 /100 WBC    Neutrophils (Absolute) 6.91 1.82 - 7.42 K/uL    Lymphs (Absolute) 2.91 1.00 - 4.80 K/uL    Monos (Absolute) 1.08 (H) 0.00 - 0.85 K/uL    Eos (Absolute) 0.15 0.00 - 0.51 K/uL    Baso (Absolute) 0.04 0.00 - 0.12 K/uL    Immature Granulocytes (abs) 0.04 0.00 - 0.11 K/uL    NRBC (Absolute) 0.00 K/uL   COMP METABOLIC PANEL    Collection Time: 04/25/18  8:27 PM   Result Value Ref Range    Sodium 138 135 - 145 mmol/L    Potassium 3.4 (L) 3.6 - 5.5 mmol/L    Chloride 104 96 - 112 mmol/L    Co2 25 20 - 33 mmol/L    Anion Gap 9.0 0.0 - 11.9    Glucose 109 (H) 65 - 99 mg/dL    Bun 5 (L) 8 - 22 mg/dL    Creatinine 0.69 0.50 - 1.40 mg/dL    Calcium 9.1 8.5 - 10.5 mg/dL    AST(SGOT) 11 (L) 12 - 45 U/L    ALT(SGPT) 10 2 - 50 U/L    Alkaline Phosphatase 80 30 - 99 U/L    Total Bilirubin 0.3 0.1 - 1.5 mg/dL    Albumin 3.8 3.2 - 4.9 g/dL    Total Protein 6.7 6.0 - 8.2 g/dL    Globulin 2.9 1.9 - 3.5 g/dL    A-G Ratio 1.3 g/dL   LIPASE    Collection Time: 04/25/18  8:27 PM   Result Value Ref Range    Lipase 10 (L) 11 - 82 U/L   ESTIMATED GFR    Collection Time: 04/25/18  8:27 PM   Result Value Ref Range    GFR If African American >60 >60 mL/min/1.73 m 2    GFR If Non African American >60 >60 mL/min/1.73 m 2   TROPONIN    Collection Time: 04/25/18   8:27 PM   Result Value Ref Range    Troponin I <0.01 0.00 - 0.04 ng/mL   POC UA    Collection Time: 04/26/18  1:35 AM   Result Value Ref Range    POC Color Yellow     POC Appearance Clear     POC Glucose Negative Negative mg/dL    POC Ketones Negative Negative mg/dL    POC Specific Gravity 1.015 1.005 - 1.030    POC Blood Trace-intact (A) Negative    POC Urine PH 7.0 5.0 - 8.0    POC Protein Negative Negative mg/dL    POC Nitrites Negative Negative    POC Leukocyte Esterase Negative Negative       Imaging/Procedures Review:    Imaging Review: Completed     CT-ABDOMEN-PELVIS WITH   Final Result         1. No acute abnormality identified in the abdomen or pelvis.           EKG:   EKG  Review: Completed  QTc:445, HR: 103,   Sinus Tachycardia.  no ST/T changes   Possible AK depression, but less than one box.            Assessment & Plan     * Epigastric pain   Assessment & Plan    Difficult to evaluate pain, due to patient's current status/somnolence after Ativan.  Given and is accompanied by nausea, vomiting, and diarrhea, it may be secondary to gastroenteritis or possibly PUD.  Patient has been receiving fluids and currently nothing by mouth, for possible bowel rest.  Day team should reassessed and further evaluate the nature of his pain, when he is more coherent.        Tachycardia   Assessment & Plan    Patient continued to tachycardiac for a while, after having received multiple boluses. However, he later improved his heart rate, while on continued saline after the initial boluses.  His EKG was sinus tachycardic, and had slight depression of AK intervals, but was lost in one box, and his troponins were negative.  Patient has a history of amphetamine use. Awaiting current screen, to evaluate for possible drug interactions.  Currently improving on saline.  Dictating to further evaluate any possible chest pains or complaints, when he is alert enough to answer accurately.            Anticipated Hospital stay:  Observation admit    Quality Measures  Quality-Core Measures   Reviewed items::  EKG reviewed, Medications reviewed and Labs reviewed  Palacios catheter::  No Palacios  DVT prophylaxis pharmacological::  Enoxaparin (Lovenox)  DVT prophylaxis - mechanical:  SCDs      Parts of this note were created with a computerized dictation system.    Despite review, there may be some spelling or grammatical errors.    Alfie Echols M.D.  4/26/2018

## 2018-04-26 NOTE — SENIOR ADMIT NOTE
"Mr. Ortega is a 32 y.o. male with PMHx of Psychiatric disorder, drug abuse, pancreatitis,  presented with to ED w/ complaint of abdominal pain onset 3 days ago associated w/ nauzia, vomiting NBNB, diarrhea. Pt was given Ativan for presumed EtOH withdrawal by EDP. Upon our involvement in his care pt was sleepy and falls asleep mid sentence making hx taking very difficult. Per EDP report pain is worse with eating or drinking alcohol. He self tx w/ Motrin and Antacids without any benefit. His pain feels the same as his previous pancreatitis episodes. Denies  f/c, vision changes, sore throat, cp, palpitations, sob, dysuria, frequency, urgency, blood in emesis. Pt denies to us use of drugs, or drinking alcohol today. States his abd is very tender. Denies n/v, d/c, f/c. Pain appears to be around umbilical area. Unable to get further information at this time  No hx of abd surgery.    Exam:  /91   Pulse (!) 108   Temp 37.4 °C (99.4 °F)   Resp 18   Ht 1.803 m (5' 11\")   Wt 74.3 kg (163 lb 12.8 oz)   SpO2 92%   BMI 22.85 kg/m²     Physical exam:   General: comfortable,not in acute distress  HEENT: NC/AT. PERRLA, EOMI. Dry mucous membranes. No lymphadenopathy.  CVS: tachycardic, S1S2 WNL, no MRG  RS: CTA, good air entry. No wheezes or ronchi.  Abdomen: Soft, Tender/ND, BS +. No organomegaly  Ext: No pedal edema  MSK: no deformity  Neuro: sensation intact, partially follows commands then falls asleep.      Labs:  Recent Labs      04/25/18 2027   SODIUM  138   POTASSIUM  3.4*   CHLORIDE  104   CO2  25   BUN  5*   CREATININE  0.69   CALCIUM  9.1       Recent Labs      04/25/18 2027   ALTSGPT  10   ASTSGOT  11*   ALKPHOSPHAT  80   TBILIRUBIN  0.3   LIPASE  10*   GLUCOSE  109*       Recent Labs      04/25/18 2027   RBC  4.74   HEMOGLOBIN  14.9   HEMATOCRIT  42.6   PLATELETCT  274       Recent Labs      04/25/18 2027   WBC  11.1*   NEUTSPOLYS  62.10   LYMPHOCYTES  26.10   MONOCYTES  9.70   EOSINOPHILS  1.30 "   BASOPHILS  0.40   ASTSGOT  11*   ALTSGPT  10   ALKPHOSPHAT  80   TBILIRUBIN  0.3         CT-ABDOMEN-PELVIS WITH   Final Result         1. No acute abnormality identified in the abdomen or pelvis.          Assessment  # Abd pain - ?gastritis form IBU overuse vs. Pancreatitis. Limited exam due to being given Ativan. Day team to re-assess abd.  # mild leukocytosis - may be reactive.  # Electrolyte abnormality  # Elevated glucose    Plan  - Admit to tele obs  - UDS neg  - BAL   - UA  - Lipase 10, Ammonia 33  - IVNS  - trop neg  - ECG: QTc 445, sinus tach, early repol, non-specific ST changes  - CXR neg  - CT abd neg for organ or AAA pathology   - monitor electrolytes and replace as necessary    Full code  DVT prophylaxis lovenox  GI prophylaxis - not indicated    For further details , please refer to H&P by Dr. Echols

## 2018-04-26 NOTE — CARE PLAN
Problem: Safety  Goal: Will remain free from falls  Outcome: PROGRESSING AS EXPECTED  Bed in lowest position with call light and bedside table within reach. Instructed patient to use call light for assistance,verbalized understanding. Bed alarm on.

## 2018-04-26 NOTE — ASSESSMENT & PLAN NOTE
- Tachy overnight and in ER  - -110  - was given ativan 2 mg in ER for probable alcohol withdrawal  - pt denies EtOH past 2-3 days, had small amount 3 days ago, he does not really give good info  - per chart review from prior presentation he was taking 1/2 bottle of vodka/day for 13 years  - not tachycardic on bedside eval this am and pm  - EKG : NSR  - UDS negative this admission  - Tele : nil abnormal  Plan  - ctm  - IV fluids for now  - transfer to medical floor (orders placed)

## 2018-04-26 NOTE — ED TRIAGE NOTES
"Justice Ortega  Chief Complaint   Patient presents with   • Abdominal Pain   • Nausea/Vomiting/Diarrhea     Pt ambulatory to triage with above complaint. Pt states s/s started 3 days ago. Pt has taken motrin and antacid at home with no relief.     /91   Pulse (!) 118   Temp 37.4 °C (99.4 °F)   Resp 18   Ht 1.803 m (5' 11\")   Wt 74.3 kg (163 lb 12.8 oz)   SpO2 94%   BMI 22.85 kg/m²     Pt informed of triage process and encouraged to notify staff of any changes or concerns. Pt verbalized understanding of instructions. Apologized for long wait time. Pt placed back in lobby.     "

## 2018-04-26 NOTE — ASSESSMENT & PLAN NOTE
- since admission to floor  - / was ok during ERP assessment  - UDS negative  - BAL 0.1  - oriented to time, place, person, event  - drowsy all day --> ? Likely due to ativan given in ER  Plan  - ammonia level  - CT Head

## 2018-04-26 NOTE — ASSESSMENT & PLAN NOTE
- h/o meth use, marijuana use, ecstasy, heavy EtOH use  - pt drowsy and does not give all info  - endorses to not doing drugs past 1 month  - UDS this admission negative  - BAL wsa 0.01, acetaminophen and salicylate levels wnl this admission  Plan  - SW consult for counseling

## 2018-04-26 NOTE — PROGRESS NOTES
Pt brought by contreras magana/ zoll.  Pt lethargic but AOX4 once awake.  Monitor applied.  Monitor room called - [facility]

## 2018-04-26 NOTE — ASSESSMENT & PLAN NOTE
- limited info from patient, drowsy, info form chart review  - epigastric pain 3 days, associated with N, V, D --- similar to pain of previous pancreatitis  - pain better this pm per pt  - no nausea, vomiting or diarrhea on floor  - per pt intermittent abdo pain for 2 years  - EtOH abuse, other substance abuse (UDS +ve for Amphetamine and cannabinoids prior presentation)  - UDS this admission negaive  - BAL 0.01  - salicylate, acetaminophen levels wnl  - LFTs, CBC, Lipase wnl  - soft, non-tender abdomen  - CT Abdo/Pelvis - nil acute, no features of chronic pancreatitis evident  Imp : Likely gastritis / dyspepsia  Plan  - Change pepcid to PPI  - sucralfate  - PO diet if passes bedside swallow eval  - DIANA  - encourage pt to ambulate  - compazine and zofran PRN  - stool sample for FOBT, and leucocytes if has BM

## 2018-04-26 NOTE — ASSESSMENT & PLAN NOTE
- per chart review --> half bottle of vodka past 13 years  - pt denies EtOH intake past 2-3 days, had a small amount 3 days ago, did not quantify when asked to specify  - SW consult for ounseling

## 2018-04-26 NOTE — ED PROVIDER NOTES
"ED Provider Note    Scribed for Sam Lee M.D. by Primitivo Weathers. 4/25/2018, 10:51 PM.    Primary care provider: Pcp Pt States None  Means of arrival: Walk-In  History obtained from: Patient  History limited by: None    CHIEF COMPLAINT  Chief Complaint   Patient presents with   • Abdominal Pain   • Nausea/Vomiting/Diarrhea     ERASMO Ortega is a 32 y.o. male who presents to the Emergency Department complaining of upper abdominal pain onset 3 days ago. He has had associated nausea, vomiting, and diarrhea. His pain is exacerbated when he eats meals or drinks alcohol. Patient tried to use Motrin and Antacid at home which did not provide any relief. He has previously had pancreatitis and thinks it feels similar. Denies alcohol use tonight. Denies any previous abdominal surgery. Denies any other medical problems.    REVIEW OF SYSTEMS  Positive abdominal pain, nausea, vomiting, diarrhea. Patient denies fever, vision change, sore throat, chest pain, shortness of breath, dysuria, rashes, alcohol abuse.    C. All other systems reviewed and negative.    PAST MEDICAL HISTORY   has a past medical history of Psychiatric disorder.    SURGICAL HISTORY   has a past surgical history that includes neck exploration (Left, 8/14/2015) and wound closure general (Left, 8/14/2015).    SOCIAL HISTORY  Social History   Substance Use Topics   • Smoking status: Current Every Day Smoker     Packs/day: 1.00     Years: 5.00     Types: Cigarettes   • Smokeless tobacco: Never Used   • Alcohol use Yes      Comment: Daily      History   Drug Use No     FAMILY HISTORY  History reviewed. No pertinent family history.    CURRENT MEDICATIONS  Reviewed.  See Encounter Summary.     ALLERGIES  No Known Allergies    PHYSICAL EXAM  VITAL SIGNS: /91   Pulse (!) 118   Temp 37.4 °C (99.4 °F)   Resp 18   Ht 1.803 m (5' 11\")   Wt 74.3 kg (163 lb 12.8 oz)   SpO2 94%   BMI 22.85 kg/m²     Pulse ox interpretation: I interpret this pulse ox " as normal.  Constitutional: Appears intoxicated in mild apparent distress.  HENT: Head normocephalic, atraumatic, Bilateral external ears normal, Nose normal.  Eyes: Pupils are equal, extraocular movements intact, Conjunctiva normal, Non-icteric.   Neck: Normal range of motion, Supple, No stridor.   Cardiovascular: Tachycardic rate and regular rhythm   Thorax & Lungs: No acute respiratory distress, No wheezing, No increased work of breathing.   Abdomen: Soft, Non tender, Non-distended, No pulsatile masses, No peritoneal signs.  Skin: Warm, Dry, No erythema, No rash.   Extremities: Intact distal pulses, No edema, No tenderness, No cyanosis.    Neurologic: Alert , Normal motor function, Normal sensory function, No focal deficits noted.   Psychiatric: Appears slightly intoxicated.    DIAGNOSTIC STUDIES / PROCEDURES     LABS  Results for orders placed or performed during the hospital encounter of 04/25/18   CBC WITH DIFFERENTIAL   Result Value Ref Range    WBC 11.1 (H) 4.8 - 10.8 K/uL    RBC 4.74 4.70 - 6.10 M/uL    Hemoglobin 14.9 14.0 - 18.0 g/dL    Hematocrit 42.6 42.0 - 52.0 %    MCV 89.9 81.4 - 97.8 fL    MCH 31.4 27.0 - 33.0 pg    MCHC 35.0 33.7 - 35.3 g/dL    RDW 39.9 35.9 - 50.0 fL    Platelet Count 274 164 - 446 K/uL    MPV 9.2 9.0 - 12.9 fL    Neutrophils-Polys 62.10 44.00 - 72.00 %    Lymphocytes 26.10 22.00 - 41.00 %    Monocytes 9.70 0.00 - 13.40 %    Eosinophils 1.30 0.00 - 6.90 %    Basophils 0.40 0.00 - 1.80 %    Immature Granulocytes 0.40 0.00 - 0.90 %    Nucleated RBC 0.00 /100 WBC    Neutrophils (Absolute) 6.91 1.82 - 7.42 K/uL    Lymphs (Absolute) 2.91 1.00 - 4.80 K/uL    Monos (Absolute) 1.08 (H) 0.00 - 0.85 K/uL    Eos (Absolute) 0.15 0.00 - 0.51 K/uL    Baso (Absolute) 0.04 0.00 - 0.12 K/uL    Immature Granulocytes (abs) 0.04 0.00 - 0.11 K/uL    NRBC (Absolute) 0.00 K/uL   COMP METABOLIC PANEL   Result Value Ref Range    Sodium 138 135 - 145 mmol/L    Potassium 3.4 (L) 3.6 - 5.5 mmol/L    Chloride  104 96 - 112 mmol/L    Co2 25 20 - 33 mmol/L    Anion Gap 9.0 0.0 - 11.9    Glucose 109 (H) 65 - 99 mg/dL    Bun 5 (L) 8 - 22 mg/dL    Creatinine 0.69 0.50 - 1.40 mg/dL    Calcium 9.1 8.5 - 10.5 mg/dL    AST(SGOT) 11 (L) 12 - 45 U/L    ALT(SGPT) 10 2 - 50 U/L    Alkaline Phosphatase 80 30 - 99 U/L    Total Bilirubin 0.3 0.1 - 1.5 mg/dL    Albumin 3.8 3.2 - 4.9 g/dL    Total Protein 6.7 6.0 - 8.2 g/dL    Globulin 2.9 1.9 - 3.5 g/dL    A-G Ratio 1.3 g/dL   LIPASE   Result Value Ref Range    Lipase 10 (L) 11 - 82 U/L   ESTIMATED GFR   Result Value Ref Range    GFR If African American >60 >60 mL/min/1.73 m 2    GFR If Non African American >60 >60 mL/min/1.73 m 2   POC UA   Result Value Ref Range    POC Color Yellow     POC Appearance Clear     POC Glucose Negative Negative mg/dL    POC Ketones Negative Negative mg/dL    POC Specific Gravity 1.015 1.005 - 1.030    POC Blood Trace-intact (A) Negative    POC Urine PH 7.0 5.0 - 8.0    POC Protein Negative Negative mg/dL    POC Nitrites Negative Negative    POC Leukocyte Esterase Negative Negative   EKG (NOW)   Result Value Ref Range    Report       [facility]    Test Date:  2018  Pt Name:    EUGENE RUEDA                Department: ER  MRN:        [id number]                      Room:  Gender:     Male                         Technician: [id number]  :        1985                   Requested By:ER TRIAGE PROTOCOL  Order #:    [id number]                    Reading MD:    Measurements  Intervals                                Axis  Rate:       103                          P:          69  VA:         140                          QRS:        54  QRSD:       82                           T:          39  QT:         340  QTc:        445    Interpretive Statements  SINUS TACHYCARDIA  CONSIDER LEFT ATRIAL ABNORMALITY  Compared to ECG 10/25/2017 01:36:52  Sinus rhythm no longer present       All labs were reviewed by me.    EKG  12 Lead EKG  interpreted by me to show:  Sinus Tachycardia  Rate 103  Intervals: Normal  No ST-T wave changes suggestive of ischemia  Impression: Normal EKG     RADIOLOGY  CT-ABDOMEN-PELVIS WITH   Final Result         1. No acute abnormality identified in the abdomen or pelvis.        The radiologist's interpretation of all radiological studies have been reviewed by me.    COURSE & MEDICAL DECISION MAKING  Pertinent Labs & Imaging studies reviewed. (See chart for details)    8:11 PM - Ordered POC Urinalysis, Lipase, CMP, CBC, Estimated GFR in triage.    10:51 PM - Patient seen and examined at bedside. Patient will be treated with 4mg Zofran, 10mL Maalox Plus, IV fluids for tachycardia. Ordered CT-Abdomen to evaluate his symptoms.    12:31 AM - Patient seen at bedside and feels improved but still has pain. He remains tachycardic after treatment with IV fluids. Treated with 2mg Ativan.    1:42 AM - Patient seen at bedside and is sleeping comfortably. He remains persistently tachycardic in the 100s. Paged [facility].    1:48 AM - Consulted with [facility] who is aware of the patient and agrees to admit the patient into their care.    Decision Making:  This is a 32 y.o. year old male who presents with epigastric pain, and tachycardia. Here, the patient was initially complaining of some epigastric pain, with some associated nausea and vomiting. Patient was given Zofran for the vomiting, and on abdominal examination did not seem to have significant abdominal tenderness. Nevertheless, differential included acute pancreatitis versus alcoholic gastritis versus dehydration versus alcohol withdrawal. The patient was given Maalox without improvement of his epigastric pain, laboratory analysis here shows no evidence of elevated lipase to suggest pancreatitis, the skin of the abdomen and pelvis here shows no evidence of pseudocyst, chronic pancreatitis, or other intra-abdominal pathology that could be the etiology of  symptoms. The patient was given 2 L normal saline secondary to concern for the possibility of dehydration, however the patient continued to be tachycardic. Other considerations included alcohol withdrawal, and the patient was given 2 mg of Ativan here. His bite this, the patient still continued to be tachycardic, and given concern for this and the safety of the patient, he was subsequently admitted to the internal medicine service for continued monitoring given his persistent tachycardia despite interventions.    DISPOSITION:  Patient will be admitted to [facility] in guarded condition.    FINAL IMPRESSION  1. Persistent tachycardia  2. History of alcoholism  2. History of pancreatitis    Primitivo TARIQ (Scribe), am scribing for, and in the presence of, Sam Lee M.D.    Electronically signed by: Primitivo Weathers (Brenda), 4/25/2018    Sam TARIQ M.D. personally performed the services described in this documentation, as scribed by Primitivo Weathers in my presence, and it is both accurate and complete.    The note accurately reflects work and decisions made by me.  Sam Lee  4/26/2018  3:25 AM

## 2018-04-27 VITALS
WEIGHT: 159.39 LBS | BODY MASS INDEX: 22.31 KG/M2 | DIASTOLIC BLOOD PRESSURE: 81 MMHG | TEMPERATURE: 98.7 F | HEART RATE: 76 BPM | SYSTOLIC BLOOD PRESSURE: 128 MMHG | OXYGEN SATURATION: 100 % | RESPIRATION RATE: 16 BRPM | HEIGHT: 71 IN

## 2018-04-27 PROCEDURE — 700105 HCHG RX REV CODE 258: Performed by: STUDENT IN AN ORGANIZED HEALTH CARE EDUCATION/TRAINING PROGRAM

## 2018-04-27 PROCEDURE — 99217 PR OBSERVATION CARE DISCHARGE: CPT | Mod: GC | Performed by: INTERNAL MEDICINE

## 2018-04-27 PROCEDURE — 700111 HCHG RX REV CODE 636 W/ 250 OVERRIDE (IP): Performed by: STUDENT IN AN ORGANIZED HEALTH CARE EDUCATION/TRAINING PROGRAM

## 2018-04-27 PROCEDURE — 302135 SEQUENTIAL COMPRESSION MACHINE: Performed by: HOSPITALIST

## 2018-04-27 PROCEDURE — A9270 NON-COVERED ITEM OR SERVICE: HCPCS | Performed by: STUDENT IN AN ORGANIZED HEALTH CARE EDUCATION/TRAINING PROGRAM

## 2018-04-27 PROCEDURE — 96372 THER/PROPH/DIAG INJ SC/IM: CPT

## 2018-04-27 PROCEDURE — 700102 HCHG RX REV CODE 250 W/ 637 OVERRIDE(OP): Performed by: STUDENT IN AN ORGANIZED HEALTH CARE EDUCATION/TRAINING PROGRAM

## 2018-04-27 PROCEDURE — G0378 HOSPITAL OBSERVATION PER HR: HCPCS

## 2018-04-27 RX ORDER — OMEPRAZOLE 20 MG/1
40 CAPSULE, DELAYED RELEASE ORAL DAILY
Qty: 30 CAP | Refills: 3 | Status: SHIPPED | OUTPATIENT
Start: 2018-04-28 | End: 2018-04-27

## 2018-04-27 RX ORDER — OMEPRAZOLE 20 MG/1
40 CAPSULE, DELAYED RELEASE ORAL DAILY
Qty: 30 CAP | Refills: 3 | Status: SHIPPED | OUTPATIENT
Start: 2018-04-28

## 2018-04-27 RX ORDER — SUCRALFATE ORAL 1 G/10ML
1 SUSPENSION ORAL EVERY 6 HOURS
Qty: 300 ML | Refills: 0 | Status: SHIPPED | OUTPATIENT
Start: 2018-04-27 | End: 2018-04-27

## 2018-04-27 RX ORDER — SUCRALFATE ORAL 1 G/10ML
1 SUSPENSION ORAL EVERY 6 HOURS
Qty: 300 ML | Refills: 0 | Status: SHIPPED | OUTPATIENT
Start: 2018-04-27

## 2018-04-27 RX ADMIN — SODIUM CHLORIDE: 9 INJECTION, SOLUTION INTRAVENOUS at 02:03

## 2018-04-27 RX ADMIN — OMEPRAZOLE 20 MG: 20 CAPSULE, DELAYED RELEASE ORAL at 05:52

## 2018-04-27 RX ADMIN — SUCRALFATE 1 G: 1 SUSPENSION ORAL at 05:52

## 2018-04-27 RX ADMIN — STANDARDIZED SENNA CONCENTRATE AND DOCUSATE SODIUM 1 TABLET: 8.6; 5 TABLET, FILM COATED ORAL at 05:52

## 2018-04-27 RX ADMIN — SUCRALFATE 1 G: 1 SUSPENSION ORAL at 01:53

## 2018-04-27 RX ADMIN — ENOXAPARIN SODIUM 40 MG: 100 INJECTION SUBCUTANEOUS at 05:53

## 2018-04-27 RX ADMIN — SUCRALFATE 1 G: 1 SUSPENSION ORAL at 10:41

## 2018-04-27 RX ADMIN — SODIUM CHLORIDE: 9 INJECTION, SOLUTION INTRAVENOUS at 10:42

## 2018-04-27 NOTE — CARE PLAN
Problem: Communication  Goal: The ability to communicate needs accurately and effectively will improve  Outcome: PROGRESSING AS EXPECTED  Pt somnolent for much of shift, when pt awake he communicates needs effectively to staff. Will continue to monitor.

## 2018-04-27 NOTE — CARE PLAN
Problem: Infection  Goal: Will remain free from infection  Outcome: PROGRESSING AS EXPECTED  Educated patient on the importance of good handwashing for self and staff, verbalized understanding.

## 2018-04-27 NOTE — DISCHARGE INSTRUCTIONS
Discharge Instructions    Discharged to home by car with relative. Discharged via walking, hospital escort: Refused.  Special equipment needed: Not Applicable    Be sure to schedule a follow-up appointment with your primary care doctor or any specialists as instructed.     Discharge Plan:        I understand that a diet low in cholesterol, fat, and sodium is recommended for good health. Unless I have been given specific instructions below for another diet, I accept this instruction as my diet prescription.   Other diet: REGULAR    Special Instructions: None    Sucralfate oral suspension  What is this medicine?  SUCRALFATE (LYNDSEY kate fate) helps to treat ulcers of the intestine.  This medicine may be used for other purposes; ask your health care provider or pharmacist if you have questions.  COMMON BRAND NAME(S): Carafate  What should I tell my health care provider before I take this medicine?  They need to know if you have any of these conditions:  -kidney disease  -an unusual or allergic reaction to sucralfate, other medicines, foods, dyes, or preservatives  -pregnant or trying to get pregnant  -breast-feeding  How should I use this medicine?  Take this medicine by mouth with a glass of water. Follow the directions on the prescription label. Shake well before using. Use a specially marked spoon or container to measure your medicine. Ask your pharmacist if you do not have one. Household spoons are not accurate. This medicine works best if you take it on an empty stomach, 1 hour before meals. Take your doses at regular intervals. Do not take your medicine more often than directed. Do not stop taking except on your doctor's advice.  Talk to your pediatrician regarding the use of this medicine in children. Special care may be needed.  Overdosage: If you think you have taken too much of this medicine contact a poison control center or emergency room at once.  NOTE: This medicine is only for you. Do not share this  medicine with others.  What if I miss a dose?  If you miss a dose, take it as soon as you can. If it is almost time for your next dose, take only that dose. Do not take double or extra doses.  What may interact with this medicine?  -antacid  -cimetidine  -digoxin  -ketoconazole  -phenytoin  -quinidine  -ranitidine  -some antibiotics like ciprofloxacin, norfloxacin, and ofloxacin  -theophylline  -thyroid hormones  -warfarin  This list may not describe all possible interactions. Give your health care provider a list of all the medicines, herbs, non-prescription drugs, or dietary supplements you use. Also tell them if you smoke, drink alcohol, or use illegal drugs. Some items may interact with your medicine.  What should I watch for while using this medicine?  Visit your doctor or health care professional for regular check ups. Let your doctor know if your symptoms do not improve or if you feel worse.  Antacids should not be taken within one half hour before or after this medicine.  What side effects may I notice from receiving this medicine?  Side effects that you should report to your doctor or health care professional as soon as possible:  -allergic reactions like skin rash, itching or hives, swelling of the face, lips, or tongue  -difficulty breathing  Side effects that usually do not require medical attention (report to your doctor or health care professional if they continue or are bothersome):  -back pain  -constipation  -drowsy, dizzy  -dry mouth  -headache  -stomach upset, gas  -trouble sleeping  This list may not describe all possible side effects. Call your doctor for medical advice about side effects. You may report side effects to FDA at 0-380-FDA-9567.  Where should I keep my medicine?  Keep out of the reach of children.  Store at room temperature between 20 and 25 degrees C (68 and 77 degrees F). Keep container tightly closed. Throw away any unused medicine after the expiration date.  NOTE: This sheet is  a summary. It may not cover all possible information. If you have questions about this medicine, talk to your doctor, pharmacist, or health care provider.  © 2018 [employer] Standard (2009-08-19 15:47:18)      Omeprazole capsules (sprinkle caps) - Rx  What is this medicine?  OMEPRAZOLE (oh ME pray zol) prevents the production of acid in the stomach. It is used to treat gastroesophageal reflux disease (GERD), ulcers, certain bacteria in the stomach, inflammation of the esophagus, and Zollinger-Dias Syndrome. It is also used to treat other conditions that cause too much stomach acid.  This medicine may be used for other purposes; ask your health care provider or pharmacist if you have questions.  COMMON BRAND NAME(S): Prilosec  What should I tell my health care provider before I take this medicine?  They need to know if you have any of these conditions:  -liver disease  -low levels of magnesium in the blood  -lupus  -an unusual or allergic reaction to omeprazole, other medicines, foods, dyes, or preservatives  -pregnant or trying to get pregnant  -breast-feeding  How should I use this medicine?  Take this medicine by mouth with a glass of water. Follow the directions on the prescription label. Do not crush, break or chew the capsules. They can be opened and the contents sprinkled on a small amount of applesauce or yogurt, given with fruit juices, or swallowed immediately with water. This medicine works best if taken on an empty stomach 30 to 60 minutes before breakfast. Take your doses at regular intervals. Do not take your medicine more often than directed.  Talk to your pediatrician regarding the use of this medicine in children. Special care may be needed.  Overdosage: If you think you have taken too much of this medicine contact a poison control center or emergency room at once.  NOTE: This medicine is only for you. Do not share this medicine with others.  What if I miss a dose?  If you miss a dose, take it  as soon as you can. If it is almost time for your next dose, take only that dose. Do not take double or extra doses.  What may interact with this medicine?  Do not take this medicine with any of the following medications:  -atazanavir  -clopidogrel  -nelfinavir  This medicine may also interact with the following medications:  -ampicillin  -certain medicines for anxiety or sleep  -certain medicines that treat or prevent blood clots like warfarin  -cyclosporine  -diazepam  -digoxin  -disulfiram  -diuretics  -iron salts  -methotrexate  -mycophenolate mofetil  -phenytoin  -prescription medicine for fungal or yeast infection like itraconazole, ketoconazole, voriconazole  -saquinavir  -tacrolimus  This list may not describe all possible interactions. Give your health care provider a list of all the medicines, herbs, non-prescription drugs, or dietary supplements you use. Also tell them if you smoke, drink alcohol, or use illegal drugs. Some items may interact with your medicine.  What should I watch for while using this medicine?  It can take several days before your stomach pain gets better. Check with your doctor or health care professional if your condition does not start to get better, or if it gets worse.  You may need blood work done while you are taking this medicine.  What side effects may I notice from receiving this medicine?  Side effects that you should report to your doctor or health care professional as soon as possible:  -allergic reactions like skin rash, itching or hives, swelling of the face, lips, or tongue  -bone, muscle or joint pain  -breathing problems  -chest pain or chest tightness  -dark yellow or brown urine  -dizziness  -fast, irregular heartbeat  -feeling faint or lightheaded  -fever or sore throat  -muscle spasm  -palpitations  -rash on cheeks or arms that gets worse in the sun  -redness, blistering, peeling or loosening of the skin, including inside the mouth  -seizures  -tremors  -unusual  bleeding or bruising  -unusually weak or tired  -yellowing of the eyes or skin  Side effects that usually do not require medical attention (report to your doctor or health care professional if they continue or are bothersome):  -constipation  -diarrhea  -dry mouth  -headache  -nausea  This list may not describe all possible side effects. Call your doctor for medical advice about side effects. You may report side effects to FDA at 3-572-PKN-1225.  Where should I keep my medicine?  Keep out of the reach of children.  Store at room temperature between 15 and 30 degrees C (59 and 86 degrees F). Protect from light and moisture. Throw away any unused medicine after the expiration date.  NOTE: This sheet is a summary. It may not cover all possible information. If you have questions about this medicine, talk to your doctor, pharmacist, or health care provider.  © 2018 [employer] Standard (2017-01-19 12:18:47)    Substance Use Disorder  Substance use disorder happens when a person's repeated use of drugs or alcohol interferes with his or her ability to be productive. This disorder can cause problems with your mental and physical health. It can affect your ability to have healthy relationships, and it can keep you from being able to meet your responsibilities at work, home, or school. It can also lead to addiction.  The most commonly abused substances include:  · Alcohol.  · Tobacco.  · Marijuana.  · Stimulants, such as cocaine and methamphetamine.  · Hallucinogens, such as LSD and PCP.  · Opioids, such as some prescription pain medicines and heroin.  What are the causes?  This condition may develop due to social, psychological, or physical reasons. Causes include:  · Stress.  · Abuse.  · Peer pressure.  · Anxiety.  · Depression.  What increases the risk?  This condition is more likely to develop in people who:  · Are stressed.  · Have been abused.  · Have a mental health disorder, such as depression.  · Are born with  certain genes.  What are the signs or symptoms?  Symptoms of this condition include:  · Using the substance for longer periods of time or at a higher dosage than what is normal or intended.  · Having a lasting desire to use the substance.  · Being unable to slow down or stop your use of the substance.  · Spending an abnormal amount of time seeking the substance, using the substance, or recovering from using the substance.  · Craving the substance.  · Substance use that:  ¨ Interferes with your work, school, or home life.  ¨ Interferes with your personal and social relationships.  ¨ Makes you give up activities that you once enjoyed or found important.  · Using the substance even though:  ¨ You know it is dangerous or bad for your health.  ¨ You know it is causing problems in your life.  · Needing more and more of the substance to get the same effect (developing tolerance).  · Experiencing physical symptoms if you do not use the substance (withdrawal).  · Using the substance to avoid withdrawal.  How is this diagnosed?  This condition may be diagnosed based on:  · Your history of substance use.  · The way in which substance abuse affects your life.  · Having at least two symptoms of substance use disorder within a 12-month period.  Your health care provider may also test your blood and urine for alcohol and drugs.  How is this treated?  This condition may be treated by:  · Stopping substance use safely. This may require taking medicines and being closely observed for several days.  · Taking part in group and individual counseling from mental health providers who help people with substance use disorder.  · Staying at a residential treatment center for several days or weeks.  · Attending daily counseling sessions at a treatment center.  · Taking medicine as told by your health care provider:  ¨ To ease symptoms and prevent complications during withdrawal.  ¨ To treat other mental health issues, such as depression or  anxiety.  ¨ To block cravings by causing the same effects as the substance.  ¨ To block the effects of the substance or replace good sensations with unpleasant ones.  · Going to a support group to share your experience with others who are going through the same thing. These groups are an important part of long-term recovery for many people. They include 12-step programs like Alcoholics Anonymous and Narcotics Anonymous.  Recovery can be a long process. Many people who undergo treatment start using the substance again after stopping. This is called a relapse. If you have a relapse, that does not mean that treatment will not work.  Follow these instructions at home:  · Take over-the-counter and prescription medicines only as told by your health care provider.  · Do not use any drugs or alcohol.  · Keep all follow-up visits as told by your health care provider. This is important. This includes continuing to work with therapists, health care providers, and support groups.  Contact a health care provider if:  · You cannot take your medicines as told.  · Your symptoms get worse.  · You have trouble resisting the urge to use drugs or alcohol.  Get help right away if:  · You have serious thoughts about hurting yourself or someone else.  · You have a relapse.  This information is not intended to replace advice given to you by your health care provider. Make sure you discuss any questions you have with your health care provider.  Document Released: 08/09/2006 Document Revised: 08/16/2017 Document Reviewed: 12/23/2015  [employer] Patient Education © 2017 [employer]  Altered Mental Status  Altered mental status most often refers to an abnormal change in your responsiveness and awareness. It can affect your speech, thought, mobility, memory, attention span, or alertness. It can range from slight confusion to complete unresponsiveness (coma). Altered mental status can be a sign of a serious underlying medical  condition. Rapid evaluation and medical treatment is necessary for patients having an altered mental status.  CAUSES   · Low blood sugar (hypoglycemia) or diabetes.  · Severe loss of body fluids (dehydration) or a body salt (electrolyte) imbalance.  · A stroke or other neurologic problem, such as dementia or delirium.  · A head injury or tumor.  · A drug or alcohol overdose.  · Exposure to toxins or poisons.  · Depression, anxiety, and stress.  · A low oxygen level (hypoxia).  · An infection.  · Blood loss.  · Twitching or shaking (seizure).  · Heart problems, such as heart attack or heart rhythm problems (arrhythmias).  · A body temperature that is too low or too high (hypothermia or hyperthermia).  DIAGNOSIS   A diagnosis is based on your history, symptoms, physical and neurologic examinations, and diagnostic tests. Diagnostic tests may include:  · Measurement of your blood pressure, pulse, breathing, and oxygen levels (vital signs).  · Blood tests.  · Urine tests.  · X-ray exams.  · A computerized magnetic scan (magnetic resonance imaging, MRI).  · A computerized X-ray scan (computed tomography, CT scan).  TREATMENT   Treatment will depend on the cause. Treatment may include:  · Management of an underlying medical or mental health condition.  · Critical care or support in the hospital.  HOME CARE INSTRUCTIONS   · Only take over-the-counter or prescription medicines for pain, discomfort, or fever as directed by your caregiver.  · Manage underlying conditions as directed by your caregiver.  · Eat a healthy, well-balanced diet to maintain strength.  · Join a support group or prevention program to cope with the condition or trauma that caused the altered mental status. Ask your caregiver to help choose a program that works for you.  · Follow up with your caregiver for further examination, therapy, or testing as directed.  SEEK MEDICAL CARE IF:   · You feel unwell or have chills.  · You or your family notice a change  in your behavior or your alertness.  · You have trouble following your caregiver's treatment plan.  · You have questions or concerns.  SEEK IMMEDIATE MEDICAL CARE IF:   · You have a rapid heartbeat or have chest pain.  · You have difficulty breathing.  · You have a fever.  · You have a headache with a stiff neck.  · You cough up blood.  · You have blood in your urine or stool.  · You have severe agitation or confusion.  MAKE SURE YOU:   · Understand these instructions.  · Will watch your condition.  · Will get help right away if you are not doing well or get worse.     This information is not intended to replace advice given to you by your health care provider. Make sure you discuss any questions you have with your health care provider.     Document Released: 06/07/2011 Document Revised: 03/11/2013 Document Reviewed: 02/11/2016  [employer] Interactive Patient Education ©2016 [employer]    Esophagitis  Introduction  Esophagitis is inflammation of the esophagus. The esophagus is the tube that carries food and liquids from your mouth to your stomach. Esophagitis can cause soreness or pain in the esophagus. This condition can make it difficult and painful to swallow.  What are the causes?  Most causes of esophagitis are not serious. Common causes of this condition include:  · Gastroesophageal reflux disease (GERD). This is when stomach contents move back up into the esophagus (reflux).  · Repeated vomiting.  · An allergic-type reaction, especially caused by food allergies (eosinophilic esophagitis).  · Injury to the esophagus by swallowing large pills with or without water, or swallowing certain types of medicines.  · Swallowing (ingesting) harmful chemicals, such as household cleaning products.  · Heavy alcohol use.  · An infection of the esophagus. This most often occurs in people who have a weakened immune system.  · Radiation or chemotherapy treatment for cancer.  · Certain diseases such as sarcoidosis, Crohn  disease, and scleroderma.  What are the signs or symptoms?  Symptoms of this condition include:  · Difficult or painful swallowing.  · Pain with swallowing acidic liquids, such as citrus juices.  · Pain with burping.  · Chest pain.  · Difficulty breathing.  · Nausea.  · Vomiting.  · Pain in the abdomen.  · Weight loss.  · Ulcers in the mouth.  · Patches of white material in the mouth (candidiasis).  · Fever.  · Coughing up blood or vomiting blood.  · Stool that is black, tarry, or bright red.  How is this diagnosed?  Your health care provider will take a medical history and perform a physical exam. You may also have other tests, including:  · An endoscopy to examine your stomach and esophagus with a small camera.  · A test that measures the acidity level in your esophagus.  · A test that measures how much pressure is on your esophagus.  · A barium swallow or modified barium swallow to show the shape, size, and functioning of your esophagus.  · Allergy tests.  How is this treated?  Treatment for this condition depends on the cause of your esophagitis. In some cases, steroids or other medicines may be given to help relieve your symptoms or to treat the underlying cause of your condition. You may have to make some lifestyle changes, such as:  · Avoiding alcohol.  · Quitting smoking.  · Changing your diet.  · Exercising.  · Changing your sleep habits and your sleep environment.  Follow these instructions at home:  Take these actions to decrease your discomfort and to help avoid complications.  Diet  · Follow a diet as recommended by your health care provider. This may involve avoiding foods and drinks such as:  ¨ Coffee and tea (with or without caffeine).  ¨ Drinks that contain alcohol.  ¨ Energy drinks and sports drinks.  ¨ Carbonated drinks or sodas.  ¨ Chocolate and cocoa.  ¨ Peppermint and mint flavorings.  ¨ Garlic and onions.  ¨ Horseradish.  ¨ Spicy and acidic foods, including peppers, chili powder, cartagena powder,  vinegar, hot sauces, and barbecue sauce.  ¨ Citrus fruit juices and citrus fruits, such as oranges, melani, and limes.  ¨ Tomato-based foods, such as red sauce, chili, salsa, and pizza with red sauce.  ¨ Fried and fatty foods, such as donuts, french fries, potato chips, and high-fat dressings.  ¨ High-fat meats, such as hot dogs and fatty cuts of red and white meats, such as rib eye steak, sausage, ham, and stock.  ¨ High-fat dairy items, such as whole milk, butter, and cream cheese.  · Eat small, frequent meals instead of large meals.  · Avoid drinking large amounts of liquid with your meals.  · Avoid eating meals during the 2-3 hours before bedtime.  · Avoid lying down right after you eat.  · Do not exercise right after you eat.  · Avoid foods and drinks that seem to make your symptoms worse.  General instructions  · Pay attention to any changes in your symptoms.  · Take over-the-counter and prescription medicines only as told by your health care provider. Do not take aspirin, ibuprofen, or other NSAIDs unless your health care provider told you to do so.  · If you have trouble taking pills, use a pill splitter to decrease the size of the pill. This will decrease the chance of the pill getting stuck or injuring your esophagus on the way down. Also, drink water after you take a pill.  · Do not use any tobacco products, including cigarettes, chewing tobacco, and e-cigarettes. If you need help quitting, ask your health care provider.  · Wear loose-fitting clothing. Do not wear anything tight around your waist that causes pressure on your abdomen.  · Raise (elevate) the head of your bed about 6 inches (15 cm).  · Try to reduce your stress, such as with yoga or meditation. If you need help reducing stress, ask your health care provider.  · If you are overweight, reduce your weight to an amount that is healthy for you. Ask your health care provider for guidance about a safe weight loss goal.  · Keep all follow-up visits  as told by your health care provider. This is important.  Contact a health care provider if:  · You have new symptoms.  · You have unexplained weight loss.  · You have difficulty swallowing, or it hurts to swallow.  · You have wheezing or a persistent cough.  · Your symptoms do not improve with treatment.  · You have frequent heartburn for more than two weeks.  Get help right away if:  · You have severe pain in your arms, neck, jaw, teeth, or back.  · You feel sweaty, dizzy, or light-headed.  · You have chest pain or shortness of breath.  · You vomit and your vomit looks like blood or coffee grounds.  · Your stool is bloody or black.  · You have a fever.  · You cannot swallow, drink, or eat.  This information is not intended to replace advice given to you by your health care provider. Make sure you discuss any questions you have with your health care provider.  Document Released: 01/25/2006 Document Revised: 05/25/2017 Document Reviewed: 04/13/2016  © 2017 [employer]      · Is patient discharged on Warfarin / Coumadin?   No     Depression / Suicide Risk    As you are discharged from this [facility] facility, it is important to learn how to keep safe from harming yourself.    Recognize the warning signs:  · Abrupt changes in personality, positive or negative- including increase in energy   · Giving away possessions  · Change in eating patterns- significant weight changes-  positive or negative  · Change in sleeping patterns- unable to sleep or sleeping all the time   · Unwillingness or inability to communicate  · Depression  · Unusual sadness, discouragement and loneliness  · Talk of wanting to die  · Neglect of personal appearance   · Rebelliousness- reckless behavior  · Withdrawal from people/activities they love  · Confusion- inability to concentrate     If you or a loved one observes any of these behaviors or has concerns about self-harm, here's what you can do:  · Talk about it- your feelings and reasons for  harming yourself  · Remove any means that you might use to hurt yourself (examples: pills, rope, extension cords, firearm)  · Get professional help from the community (Mental Health, Substance Abuse, psychological counseling)  · Do not be alone:Call your Safe Contact- someone whom you trust who will be there for you.  · Call your local CRISIS HOTLINE 320-7842 or 955-637-0923  · Call your local Children's Mobile Crisis Response Team [place] (382) 350-6444 or www.Adly  · Call the toll free National Suicide Prevention Hotlines   · National Suicide Prevention Lifeline 559-834-YPKQ (5950)  · National Hope Line Network 800-SUICIDE (332-5729)

## 2018-04-27 NOTE — DISCHARGE SUMMARY
Internal Medicine Discharge Summary  Note Author: Keenan Cárdenas M.D.       Admit Date:  4/25/2018       Discharge Date:   04/27 2018    Service:   [facility] Internal Medicine White Team  Attending Physician(s):   Dr. Vargas       Senior Resident(s):   Dr. Cárdenas  Palmer Resident(s):   Dr. Cárdenas      Primary Diagnosis:   Epigastric pain.    Secondary Diagnoses:                Principal Problem:    Epigastric pain POA: Unknown  Active Problems:    Altered mental status, unspecified POA: Unknown    Tachycardia POA: Unknown    Substance abuse POA: Unknown    History of psychiatric disorder POA: Unknown    Homeless POA: Unknown    Alcohol abuse POA: Yes      Overview: Counseled briefly  Resolved Problems:    * No resolved hospital problems. *      H/P written by Dr. Echols on 04/26  Justice Ortega is a 32 y.o. male who presents for epigastric pain for the past 3 days.  Additionally, he has noted nausea, vomiting, and diarrhea.  He states that he is tried using Motrin and antacids, but has not been able to find relief from pain. He states that he has past history of pancreatitis, but states that that was previously due to alcohol use and drugs, however, he recently denies any use of alcohol or drugs.     Additionally, the evaluation is made difficult by the patient's current somnolent state. He initially presented to the ER for severe abdominal pain and tachycardia.  Therefore, the ER physicians gave him Ativan, and he has been very tired and snoring, by the time our team was called for evaluation.  It was difficult to arouse him to ask questions, but basic history was obtained from him, but somewhat limited, due to his current status (having to repeatedly use stimulus to have him respond).     Additionally, in his past chart review, he is previously used marijuana and amphetamines in the past. Her current drug test has not yet been performed, therefore it is uncertain whether or not he is on amphetamines at the present  time. However, and that could explain the tachycardia. Additionally, he was previously noted to use alcohol on a daily basis; however, again he states that he has not used this recently.     Hospital Summary (Brief Narrative):       The patient was admitted for continuous abd pain and sinus tachy. Saline hydration was given due to clinical dehydration. The patient's HR improved slowly and back to 76-80 on 04/27. His urine drug test was negative and his TSH is within normal range. His GI symptoms improved a lot, and he reported no more abd pain on 04/27. The patient received a CT head on 04/26 due to drowsiness, which did not find any acute pathology and his mentation level back to his baseline, alert/Ox4 on 04/27. He is discharged on 04/27 with PPI. He will follow up with our [facility] team on 05/14 9am to have a regular PCP.     Patient /Hospital Summary (Details -- Problem Oriented) :        Epigastric pain  - epigastric pain 3 days, associated with N, V, D --- similar to pain of previous pancreatitis  - Normal lipase and other labs, normal CT abd.   - Symptoms improved with oral PPI and sucrafate.   -->discharge with PPI and sucrafate, PCP follow up     Tachycardia, r/o dehydration related.   - Sinus tachy up to 110/min upon admission. Improved with saline hydratoin.   - 76 at discharge     Alcohol abuse  - per chart review --> half bottle of vodka past 13 years  - Alcohol level 0.01 at admission. No withdrawal symptoms.     Substance abuse  - h/o meth use, marijuana use, ecstasy, heavy EtOH use  - neg urine drug test. No withdrawal symptoms.     Altered mental status, unspecified  - Very sleepy upon admission, improved on 04/27.   - He states he is fine now, was just tired.   - Intact neuro exams, no focal deficit, CT head neg.       History of psychiatric disorder  - h/o psychiatric disorder, unknown type  - Not taking meds, not see doctors, denied SI or HI.   - outpatient follow up if needed.     Homeless  -  homeless  - He states clearly, he will be fine after discharge, he has some friend. Politely refuse  on placement.     Consultants:     None    Procedures:        None    Imaging/ Testin/25 CT abd  1. No acute abnormality identified in the abdomen or pelvis.     CT head  No acute intracranial abnormality is identified.  Mild paranasal sinus disease.  Trace fluid in the mastoid air cells on the left.    Discharge Medications:         Medication Reconciliation: Completed       Medication List      START taking these medications      Instructions   omeprazole 20 MG delayed-release capsule  Start taking on:  2018  Commonly known as:  PRILOSEC   Take 2 Caps by mouth every day.  Dose:  40 mg     sucralfate 1 GM/10ML Susp  Commonly known as:  CARAFATE   Take 10 mL by mouth every 6 hours.  Dose:  1 g                Disposition:   Discharge    Diet:   There is no new restriction from this admission.       Activity:   There is no new restriction from this admission.       Instructions:       The patient was instructed to return to the ER in the event of worsening symptoms. I have counseled the patient on the importance of compliance and the patient has agreed to proceed with all medical recommendations and follow up plan indicated above.   The patient understands that all medications come with benefits and risks. Risks may include permanent injury or death and these risks can be minimized with close reassessment and monitoring.        Primary Care Provider:    Will see [facility] provider on May 14  Discharge summary faxed to primary care provider:  Deferred  Copy of discharge summary given to the patient: Deferred      Pending Studies:        None    Time spent on discharge day patient visit, preparing discharge paperwork and arranging for patient follow up.    Summary of follow up issues:     Discharge Time (Minutes) :    46 mins  Hospital Course Type:  Inpatient Stay >2 midnights      Condition on  Discharge    ______________________________________________________________________    Interval history/exam for day of discharge:     more awake, finished all his meal, no GI symptoms.   No subjective complaints, no pain.   Stable vitals and want to leave hospital.     Vitals:    04/26/18 2100 04/27/18 0321 04/27/18 0337 04/27/18 0745   BP:  113/74  128/81   Pulse:  81  76   Resp: 16 12 16 16   Temp:  36.9 °C (98.4 °F)  37.1 °C (98.7 °F)   SpO2:  97%  100%   Weight:       Height:         Weight/BMI: Body mass index is 22.23 kg/m².  Pulse Oximetry: 100 %, O2 (LPM): 0, O2 Delivery: None (Room Air)    Constitutional: Denies fevers.  Eyes: Denies changes in vision, no eye pain  Ears/Nose/Throat/Mouth: Denies nasal congestion or sore throat   Cardiovascular: Denies chest pain  Respiratory: Denies shortness of breath   Gastrointestinal/Hepatic: Denies abdominal pain, nausea, vomiting, or diarrhea   Musculoskeletal/Rheum: No complaints   Skin/Breast: Denies rash   Neurological: Denies headache. Denied focal weakness/parasthesias  Psychiatric: No complaints  All other systems were reviewed and are negative (AMA/CMS criteria)    HEENT: grossly normal   Cardiovascular: Normal heart rate, Normal rhythm   Lungs: Respiratory effort is normal. Normal breath sounds  Abdomen: Bowel sounds normal, Soft, No tenderness  Skin: No erythema, No rash  Lower limbs: normal, no pitting edema   Neurologic: Alert & oriented x 3, Normal motor function, Normal sensory function, No focal deficits noted, cranial nerves II through XII are normal  PSY: stable mood.   Other systems also examined, grossly normal.         Most Recent Labs:    Lab Results   Component Value Date/Time    WBC 12.5 (H) 04/26/2018 09:34 AM    RBC 4.89 04/26/2018 09:34 AM    HEMOGLOBIN 15.5 04/26/2018 09:34 AM    HEMATOCRIT 43.0 04/26/2018 09:34 AM    MCV 87.9 04/26/2018 09:34 AM    MCH 31.7 04/26/2018 09:34 AM    MCHC 36.0 (H) 04/26/2018 09:34 AM    MPV 9.2 04/26/2018 09:34  AM    NEUTSPOLYS 72.40 (H) 04/26/2018 09:34 AM    LYMPHOCYTES 16.80 (L) 04/26/2018 09:34 AM    MONOCYTES 9.80 04/26/2018 09:34 AM    EOSINOPHILS 0.20 04/26/2018 09:34 AM    BASOPHILS 0.30 04/26/2018 09:34 AM      Lab Results   Component Value Date/Time    SODIUM 136 04/26/2018 08:06 AM    POTASSIUM 4.1 04/26/2018 08:06 AM    CHLORIDE 106 04/26/2018 08:06 AM    CO2 24 04/26/2018 08:06 AM    GLUCOSE 99 04/26/2018 08:06 AM    BUN 6 (L) 04/26/2018 08:06 AM    CREATININE 0.73 04/26/2018 08:06 AM      Lab Results   Component Value Date/Time    ALTSGPT 11 04/26/2018 08:06 AM    ASTSGOT 12 04/26/2018 08:06 AM    ALKPHOSPHAT 95 04/26/2018 08:06 AM    TBILIRUBIN 0.6 04/26/2018 08:06 AM    LIPASE 10 (L) 04/25/2018 08:27 PM    ALBUMIN 3.6 04/26/2018 08:06 AM    GLOBULIN 2.8 04/26/2018 08:06 AM    INR 0.98 01/14/2018 09:03 AM    MACROCYTOSIS 1+ 06/13/2013 09:07 PM     Lab Results   Component Value Date/Time    PROTHROMBTM 12.7 01/14/2018 09:03 AM    INR 0.98 01/14/2018 09:03 AM

## 2018-04-27 NOTE — PROGRESS NOTES
Discharge Summary    Educated patient on new medications, up coming appointment, and s/s to look for when returning to emergency room. Patient verbalized understanding. PIV removed with no s/s of bleeding or infection at site. Vitals WNL. Escorted patient downstairs for discharge.

## 2018-04-27 NOTE — PROGRESS NOTES
Report received from IVAN Modi. Assumed care of patient at 1845. Fall precautions in place. Will continue to monitor.

## 2018-05-01 NOTE — ED NOTES
Patient ambulatory out of ED w/steady gait.     Arthritis    Back pain    CAD (coronary artery disease)  CABG x3  Complex tear of medial meniscus, current injury, left knee, initial encounter    HLD (hyperlipidemia)    HTN (hypertension)    Prediabetes    S/P CABG

## 2018-10-30 ENCOUNTER — APPOINTMENT (OUTPATIENT)
Dept: RADIOLOGY | Facility: MEDICAL CENTER | Age: 33
End: 2018-10-30
Attending: EMERGENCY MEDICINE
Payer: MEDICAID

## 2018-10-30 ENCOUNTER — HOSPITAL ENCOUNTER (EMERGENCY)
Facility: MEDICAL CENTER | Age: 33
End: 2018-10-30
Attending: EMERGENCY MEDICINE
Payer: MEDICAID

## 2018-10-30 VITALS
OXYGEN SATURATION: 97 % | DIASTOLIC BLOOD PRESSURE: 86 MMHG | HEIGHT: 71 IN | HEART RATE: 62 BPM | WEIGHT: 154.32 LBS | BODY MASS INDEX: 21.6 KG/M2 | SYSTOLIC BLOOD PRESSURE: 125 MMHG | TEMPERATURE: 97.7 F | RESPIRATION RATE: 13 BRPM

## 2018-10-30 DIAGNOSIS — R07.9 CHEST PAIN, UNSPECIFIED TYPE: ICD-10-CM

## 2018-10-30 DIAGNOSIS — F19.10 DRUG ABUSE (HCC): ICD-10-CM

## 2018-10-30 LAB
ALBUMIN SERPL BCP-MCNC: 4.4 G/DL (ref 3.2–4.9)
ALBUMIN/GLOB SERPL: 1.3 G/DL
ALP SERPL-CCNC: 107 U/L (ref 30–99)
ALT SERPL-CCNC: 15 U/L (ref 2–50)
AMPHET UR QL SCN: POSITIVE
ANION GAP SERPL CALC-SCNC: 7 MMOL/L (ref 0–11.9)
APPEARANCE UR: CLEAR
APTT PPP: 29.1 SEC (ref 24.7–36)
AST SERPL-CCNC: 14 U/L (ref 12–45)
BARBITURATES UR QL SCN: NEGATIVE
BASOPHILS # BLD AUTO: 0.4 % (ref 0–1.8)
BASOPHILS # BLD: 0.03 K/UL (ref 0–0.12)
BENZODIAZ UR QL SCN: NEGATIVE
BILIRUB SERPL-MCNC: 0.8 MG/DL (ref 0.1–1.5)
BILIRUB UR QL STRIP.AUTO: NEGATIVE
BNP SERPL-MCNC: 12 PG/ML (ref 0–100)
BUN SERPL-MCNC: 11 MG/DL (ref 8–22)
BZE UR QL SCN: NEGATIVE
CALCIUM SERPL-MCNC: 10.6 MG/DL (ref 8.5–10.5)
CANNABINOIDS UR QL SCN: POSITIVE
CHLORIDE SERPL-SCNC: 101 MMOL/L (ref 96–112)
CO2 SERPL-SCNC: 27 MMOL/L (ref 20–33)
COLOR UR: YELLOW
CREAT SERPL-MCNC: 0.87 MG/DL (ref 0.5–1.4)
EKG IMPRESSION: NORMAL
EOSINOPHIL # BLD AUTO: 0.07 K/UL (ref 0–0.51)
EOSINOPHIL NFR BLD: 0.9 % (ref 0–6.9)
ERYTHROCYTE [DISTWIDTH] IN BLOOD BY AUTOMATED COUNT: 40.6 FL (ref 35.9–50)
FLUAV RNA SPEC QL NAA+PROBE: NEGATIVE
FLUBV RNA SPEC QL NAA+PROBE: NEGATIVE
GLOBULIN SER CALC-MCNC: 3.3 G/DL (ref 1.9–3.5)
GLUCOSE SERPL-MCNC: 108 MG/DL (ref 65–99)
GLUCOSE UR STRIP.AUTO-MCNC: NEGATIVE MG/DL
HCT VFR BLD AUTO: 47.9 % (ref 42–52)
HGB BLD-MCNC: 16.4 G/DL (ref 14–18)
IMM GRANULOCYTES # BLD AUTO: 0.02 K/UL (ref 0–0.11)
IMM GRANULOCYTES NFR BLD AUTO: 0.2 % (ref 0–0.9)
INR PPP: 0.99 (ref 0.87–1.13)
KETONES UR STRIP.AUTO-MCNC: 40 MG/DL
LEUKOCYTE ESTERASE UR QL STRIP.AUTO: NEGATIVE
LIPASE SERPL-CCNC: 9 U/L (ref 11–82)
LYMPHOCYTES # BLD AUTO: 1.09 K/UL (ref 1–4.8)
LYMPHOCYTES NFR BLD: 13.2 % (ref 22–41)
MCH RBC QN AUTO: 31.1 PG (ref 27–33)
MCHC RBC AUTO-ENTMCNC: 34.2 G/DL (ref 33.7–35.3)
MCV RBC AUTO: 90.9 FL (ref 81.4–97.8)
METHADONE UR QL SCN: NEGATIVE
MICRO URNS: ABNORMAL
MONOCYTES # BLD AUTO: 0.74 K/UL (ref 0–0.85)
MONOCYTES NFR BLD AUTO: 9 % (ref 0–13.4)
NEUTROPHILS # BLD AUTO: 6.28 K/UL (ref 1.82–7.42)
NEUTROPHILS NFR BLD: 76.3 % (ref 44–72)
NITRITE UR QL STRIP.AUTO: NEGATIVE
NRBC # BLD AUTO: 0 K/UL
NRBC BLD-RTO: 0 /100 WBC
OPIATES UR QL SCN: NEGATIVE
OXYCODONE UR QL SCN: NEGATIVE
PCP UR QL SCN: NEGATIVE
PH UR STRIP.AUTO: 6.5 [PH]
PLATELET # BLD AUTO: 302 K/UL (ref 164–446)
PMV BLD AUTO: 9.6 FL (ref 9–12.9)
POTASSIUM SERPL-SCNC: 4 MMOL/L (ref 3.6–5.5)
PROPOXYPH UR QL SCN: NEGATIVE
PROT SERPL-MCNC: 7.7 G/DL (ref 6–8.2)
PROT UR QL STRIP: NEGATIVE MG/DL
PROTHROMBIN TIME: 13.2 SEC (ref 12–14.6)
RBC # BLD AUTO: 5.27 M/UL (ref 4.7–6.1)
RBC UR QL AUTO: NEGATIVE
SODIUM SERPL-SCNC: 135 MMOL/L (ref 135–145)
SP GR UR STRIP.AUTO: 1.02
TROPONIN I SERPL-MCNC: <0.01 NG/ML (ref 0–0.04)
UROBILINOGEN UR STRIP.AUTO-MCNC: 0.2 MG/DL
WBC # BLD AUTO: 8.2 K/UL (ref 4.8–10.8)

## 2018-10-30 PROCEDURE — 83690 ASSAY OF LIPASE: CPT

## 2018-10-30 PROCEDURE — 74018 RADEX ABDOMEN 1 VIEW: CPT

## 2018-10-30 PROCEDURE — 85025 COMPLETE CBC W/AUTO DIFF WBC: CPT

## 2018-10-30 PROCEDURE — 71045 X-RAY EXAM CHEST 1 VIEW: CPT

## 2018-10-30 PROCEDURE — 85610 PROTHROMBIN TIME: CPT

## 2018-10-30 PROCEDURE — 85730 THROMBOPLASTIN TIME PARTIAL: CPT

## 2018-10-30 PROCEDURE — 99284 EMERGENCY DEPT VISIT MOD MDM: CPT

## 2018-10-30 PROCEDURE — 700111 HCHG RX REV CODE 636 W/ 250 OVERRIDE (IP): Performed by: EMERGENCY MEDICINE

## 2018-10-30 PROCEDURE — 700102 HCHG RX REV CODE 250 W/ 637 OVERRIDE(OP): Performed by: EMERGENCY MEDICINE

## 2018-10-30 PROCEDURE — 84484 ASSAY OF TROPONIN QUANT: CPT

## 2018-10-30 PROCEDURE — 96374 THER/PROPH/DIAG INJ IV PUSH: CPT

## 2018-10-30 PROCEDURE — 93005 ELECTROCARDIOGRAM TRACING: CPT

## 2018-10-30 PROCEDURE — A9270 NON-COVERED ITEM OR SERVICE: HCPCS | Performed by: EMERGENCY MEDICINE

## 2018-10-30 PROCEDURE — 36415 COLL VENOUS BLD VENIPUNCTURE: CPT

## 2018-10-30 PROCEDURE — 80307 DRUG TEST PRSMV CHEM ANLYZR: CPT

## 2018-10-30 PROCEDURE — 80053 COMPREHEN METABOLIC PANEL: CPT

## 2018-10-30 PROCEDURE — 81003 URINALYSIS AUTO W/O SCOPE: CPT | Mod: XU

## 2018-10-30 PROCEDURE — 70450 CT HEAD/BRAIN W/O DYE: CPT

## 2018-10-30 PROCEDURE — 93005 ELECTROCARDIOGRAM TRACING: CPT | Performed by: EMERGENCY MEDICINE

## 2018-10-30 PROCEDURE — 83880 ASSAY OF NATRIURETIC PEPTIDE: CPT

## 2018-10-30 PROCEDURE — 87502 INFLUENZA DNA AMP PROBE: CPT

## 2018-10-30 RX ORDER — ONDANSETRON 2 MG/ML
4 INJECTION INTRAMUSCULAR; INTRAVENOUS ONCE
Status: COMPLETED | OUTPATIENT
Start: 2018-10-30 | End: 2018-10-30

## 2018-10-30 RX ADMIN — LIDOCAINE HYDROCHLORIDE 15 ML: 20 SOLUTION OROPHARYNGEAL at 11:08

## 2018-10-30 RX ADMIN — ONDANSETRON 4 MG: 2 INJECTION INTRAMUSCULAR; INTRAVENOUS at 11:07

## 2018-10-30 ASSESSMENT — PAIN SCALES - GENERAL: PAINLEVEL_OUTOF10: 10

## 2018-10-30 NOTE — ED PROVIDER NOTES
ED Provider Note    CHIEF COMPLAINT  Chief Complaint   Patient presents with   • Chest Pain   • Shortness of Breath       HPI  Justice Ortega is a 32 y.o. male here for evaluation of chest pain and drug abuse.  The patient states he has had upper abdominal pain and lower chest pain over the last few days, but states that this may be associated with his methamphetamine use.  The patient has no radiation of the pain, and has no associated shortness of breath, or back pain.  He does have history of nausea and vomiting today.  The patient has no leg pain.  He denies any fall or trauma.    PAST MEDICAL HISTORY   has a past medical history of Depression and Psychiatric disorder.    SOCIAL HISTORY  Social History     Social History Main Topics   • Smoking status: Current Every Day Smoker     Packs/day: 1.00     Years: 5.00     Types: Cigarettes   • Smokeless tobacco: Never Used   • Alcohol use Yes      Comment: Daily   • Drug use: Yes     Types: Inhaled      Comment: meth   • Sexual activity: Not on file       Family History  No known bleeding disorders    SURGICAL HISTORY   has a past surgical history that includes neck exploration (Left, 8/14/2015) and wound closure general (Left, 8/14/2015).    CURRENT MEDICATIONS  Home Medications     Reviewed by Viktoriya Mcfadden R.N. (Registered Nurse) on 10/30/18 at [facility]  Med List Status: Complete   Medication Last Dose Status   omeprazole (PRILOSEC) 20 MG delayed-release capsule not taking Active   sucralfate (CARAFATE) 1 GM/10ML Suspension not taking Active                ALLERGIES  No Known Allergies    REVIEW OF SYSTEMS  See HPI for further details. Review of systems as above, otherwise all other systems are negative.     PHYSICAL EXAM  Constitutional: Well-nourished, well-developed  HEENT: Slow to respond, atraumatic. Posterior pharynx clear and moist.    Eyes:  EOMI. Normal sclera.  Neck: Supple, Full range of motion, nontender.  Chest/Pulmonary: clear to ausculation.  Symmetrical expansion.   Cardio: Regular rate and rhythm with no murmur.   Abdomen: Soft, nontender. No peritoneal signs. No guarding. No palpable masses.   Musculoskeletal: No deformity, no edema, neurovascular intact.   Neuro: Slurred speech, appropriate, cooperative, cranial nerves II-XII grossly intact.  Psych: Flat affect    Results for orders placed or performed during the hospital encounter of 10/30/18   Troponin   Result Value Ref Range    Troponin I <0.01 0.00 - 0.04 ng/mL   Btype Natriuretic Peptide   Result Value Ref Range    B Natriuretic Peptide 12 0 - 100 pg/mL   CBC with Differential   Result Value Ref Range    WBC 8.2 4.8 - 10.8 K/uL    RBC 5.27 4.70 - 6.10 M/uL    Hemoglobin 16.4 14.0 - 18.0 g/dL    Hematocrit 47.9 42.0 - 52.0 %    MCV 90.9 81.4 - 97.8 fL    MCH 31.1 27.0 - 33.0 pg    MCHC 34.2 33.7 - 35.3 g/dL    RDW 40.6 35.9 - 50.0 fL    Platelet Count 302 164 - 446 K/uL    MPV 9.6 9.0 - 12.9 fL    Neutrophils-Polys 76.30 (H) 44.00 - 72.00 %    Lymphocytes 13.20 (L) 22.00 - 41.00 %    Monocytes 9.00 0.00 - 13.40 %    Eosinophils 0.90 0.00 - 6.90 %    Basophils 0.40 0.00 - 1.80 %    Immature Granulocytes 0.20 0.00 - 0.90 %    Nucleated RBC 0.00 /100 WBC    Neutrophils (Absolute) 6.28 1.82 - 7.42 K/uL    Lymphs (Absolute) 1.09 1.00 - 4.80 K/uL    Monos (Absolute) 0.74 0.00 - 0.85 K/uL    Eos (Absolute) 0.07 0.00 - 0.51 K/uL    Baso (Absolute) 0.03 0.00 - 0.12 K/uL    Immature Granulocytes (abs) 0.02 0.00 - 0.11 K/uL    NRBC (Absolute) 0.00 K/uL   Complete Metabolic Panel (CMP)   Result Value Ref Range    Sodium 135 135 - 145 mmol/L    Potassium 4.0 3.6 - 5.5 mmol/L    Chloride 101 96 - 112 mmol/L    Co2 27 20 - 33 mmol/L    Anion Gap 7.0 0.0 - 11.9    Glucose 108 (H) 65 - 99 mg/dL    Bun 11 8 - 22 mg/dL    Creatinine 0.87 0.50 - 1.40 mg/dL    Calcium 10.6 (H) 8.5 - 10.5 mg/dL    AST(SGOT) 14 12 - 45 U/L    ALT(SGPT) 15 2 - 50 U/L    Alkaline Phosphatase 107 (H) 30 - 99 U/L    Total Bilirubin 0.8  0.1 - 1.5 mg/dL    Albumin 4.4 3.2 - 4.9 g/dL    Total Protein 7.7 6.0 - 8.2 g/dL    Globulin 3.3 1.9 - 3.5 g/dL    A-G Ratio 1.3 g/dL   Prothrombin Time   Result Value Ref Range    PT 13.2 12.0 - 14.6 sec    INR 0.99 0.87 - 1.13   APTT   Result Value Ref Range    APTT 29.1 24.7 - 36.0 sec   Lipase   Result Value Ref Range    Lipase 9 (L) 11 - 82 U/L   INFLUENZA A/B BY PCR   Result Value Ref Range    Influenza virus A RNA Negative Negative    Influenza virus B, PCR Negative Negative   ESTIMATED GFR   Result Value Ref Range    GFR If African American >60 >60 mL/min/1.73 m 2    GFR If Non African American >60 >60 mL/min/1.73 m 2   URINE DRUG SCREEN   Result Value Ref Range    Amphetamines Urine Positive (A) Negative    Barbiturates Negative Negative    Benzodiazepines Negative Negative    Cocaine Metabolite Negative Negative    Methadone Negative Negative    Opiates Negative Negative    Oxycodone Negative Negative    Phencyclidine -Pcp Negative Negative    Propoxyphene Negative Negative    Cannabinoid Metab Positive (A) Negative   URINALYSIS,CULTURE IF INDICATED   Result Value Ref Range    Color Yellow     Character Clear     Specific Gravity 1.018 <1.035    Ph 6.5 5.0 - 8.0    Glucose Negative Negative mg/dL    Ketones 40 (A) Negative mg/dL    Protein Negative Negative mg/dL    Bilirubin Negative Negative    Urobilinogen, Urine 0.2 Negative    Nitrite Negative Negative    Leukocyte Esterase Negative Negative    Occult Blood Negative Negative    Micro Urine Req see below    EKG   Result Value Ref Range    Report       [facility] Emergency Dept.    Test Date:  2018-10-30  Pt Name:    EUGENE RUEDA                Department: ER  MRN:        [id number]                      Room:  Gender:     Male                         Technician: [id number]  :        1985                   Requested By:ER TRIAGE PROTOCOL  Order #:    [id number]                    [place] WILD:    Measurements  Intervals                                 Axis  Rate:       59                           P:          55  NC:         124                          QRS:        66  QRSD:       92                           T:          61  QT:         428  QTc:        424    Interpretive Statements  SINUS BRADYCARDIA  Compared to ECG 04/26/2018 07:49:20  Sinus rhythm no longer present  ST (T wave) deviation no longer present       CT-HEAD W/O   Final Result      1.  Limited by motion artifact.   2.  No gross acute intracranial abnormality.   3.  Chronic paranasal sinus disease.      [id number] VIEW   Final Result      1.  No evidence of bowel obstruction.   2.  Mild increased colonic stool.      DX-CHEST-LIMITED (1 VIEW)   Final Result      No evidence of acute cardiopulmonary process.          PROCEDURES     MEDICAL RECORD  I have reviewed patient's medical record and pertinent results are listed above.    COURSE & MEDICAL DECISION MAKING  I have reviewed any medical record information, laboratory studies and radiographic results as noted above.    If you have had any blood pressure issues while here in the emergency department, please see your doctor for a further evaluation or work up.    Differential diagnoses include but not limited to: mi, sbo, drug abuse,     3:41 PM  At this time, the pt is sleeping on my re evaluation, three times.  He is easy to arouse, and feels better after being here.  He is more awake and alert.   He is currently staying in the shelter, and will return there today.  He has no reports of SI/HI at this time.     This patient presents with drug abuse .  At this time, I have counseled the patient/family regarding their medications, pain control, and follow up.  They will continue their medications, if any, as prescribed.  They will return immediately for any worsening symptoms and/or any other medical concerns.  They will see their doctor, or contact the doctor provided, in 1-2 days for follow up.       FINAL IMPRESSION  Drug abuse        Electronically signed by: Mina Mei, 10/30/2018 12:14 PM

## 2018-10-30 NOTE — ED NOTES
Pt vomited small amount of clear liquid into trash can. Pt requesting pain meds. MD notified. Medicated per MAR. Pt seems drowsy; I politely inquire about this and he states it is because pain is so strong; he states no meth use for the past 3 days.

## 2018-10-30 NOTE — ED NOTES
Pt sleeping on his L side in bed, respirations appear even/unlabored, skin dry. Awaiting CT results.

## 2018-10-30 NOTE — ED NOTES
Pt resting supine in bed, states nausea has resided. States abd pain went away temporarily after meds but is is getting stronger. Pt continues to point to epigastric area, states it is non radiating. VS stable.

## 2018-10-30 NOTE — ED NOTES
In addition to triage note, pt reports 2 wks of productive cough w/ yellow phlegm, nasal congestion, sore throat, SOB, malaise. Also reports 3 days of epigastric pain w/ nause, decreased appetite and feeling feverish. Denies sick contacts or recent travel.

## 2018-10-30 NOTE — ED NOTES
Reviewed DC paperwork w/ pt. All questions/concerns addressed. Pt ambulates steadily to ED lobby.

## 2018-10-30 NOTE — ED TRIAGE NOTES
"Chief Complaint   Patient presents with   • Chest Pain   • Shortness of Breath     .Blood pressure 125/86, pulse (!) 104, temperature (!) 35.7 °C (96.3 °F), resp. rate 16, height 1.803 m (5' 11\"), weight 70 kg (154 lb 5.2 oz), SpO2 100 %.    Ambulatory to triage with above complaints, educated on triage process, placed in lobby, told to inform staff of any changes in condition.    "

## 2020-03-18 ENCOUNTER — APPOINTMENT (OUTPATIENT)
Dept: RADIOLOGY | Facility: MEDICAL CENTER | Age: 35
End: 2020-03-18
Attending: EMERGENCY MEDICINE
Payer: MEDICAID

## 2020-03-18 ENCOUNTER — HOSPITAL ENCOUNTER (EMERGENCY)
Facility: MEDICAL CENTER | Age: 35
End: 2020-03-18
Attending: EMERGENCY MEDICINE
Payer: MEDICAID

## 2020-03-18 VITALS
BODY MASS INDEX: 22.93 KG/M2 | OXYGEN SATURATION: 97 % | SYSTOLIC BLOOD PRESSURE: 135 MMHG | TEMPERATURE: 97.4 F | RESPIRATION RATE: 20 BRPM | HEART RATE: 92 BPM | DIASTOLIC BLOOD PRESSURE: 83 MMHG | HEIGHT: 71 IN | WEIGHT: 163.8 LBS

## 2020-03-18 DIAGNOSIS — R10.9 ABDOMINAL PAIN, UNSPECIFIED ABDOMINAL LOCATION: ICD-10-CM

## 2020-03-18 DIAGNOSIS — R05.9 COUGH: ICD-10-CM

## 2020-03-18 DIAGNOSIS — J06.9 UPPER RESPIRATORY TRACT INFECTION, UNSPECIFIED TYPE: ICD-10-CM

## 2020-03-18 LAB
ALBUMIN SERPL BCP-MCNC: 4.3 G/DL (ref 3.2–4.9)
ALBUMIN/GLOB SERPL: 1.3 G/DL
ALP SERPL-CCNC: 126 U/L (ref 30–99)
ALT SERPL-CCNC: 21 U/L (ref 2–50)
ANION GAP SERPL CALC-SCNC: 14 MMOL/L (ref 7–16)
APPEARANCE UR: CLEAR
AST SERPL-CCNC: 18 U/L (ref 12–45)
BACTERIA #/AREA URNS HPF: NEGATIVE /HPF
BASOPHILS # BLD AUTO: 0.3 % (ref 0–1.8)
BASOPHILS # BLD: 0.03 K/UL (ref 0–0.12)
BILIRUB SERPL-MCNC: 0.6 MG/DL (ref 0.1–1.5)
BILIRUB UR QL STRIP.AUTO: NEGATIVE
BUN SERPL-MCNC: 14 MG/DL (ref 8–22)
CALCIUM SERPL-MCNC: 9.2 MG/DL (ref 8.5–10.5)
CHLORIDE SERPL-SCNC: 100 MMOL/L (ref 96–112)
CO2 SERPL-SCNC: 23 MMOL/L (ref 20–33)
COLOR UR: YELLOW
CREAT SERPL-MCNC: 0.8 MG/DL (ref 0.5–1.4)
EOSINOPHIL # BLD AUTO: 0.14 K/UL (ref 0–0.51)
EOSINOPHIL NFR BLD: 1.5 % (ref 0–6.9)
EPI CELLS #/AREA URNS HPF: NEGATIVE /HPF
ERYTHROCYTE [DISTWIDTH] IN BLOOD BY AUTOMATED COUNT: 39.8 FL (ref 35.9–50)
GLOBULIN SER CALC-MCNC: 3.2 G/DL (ref 1.9–3.5)
GLUCOSE SERPL-MCNC: 94 MG/DL (ref 65–99)
GLUCOSE UR STRIP.AUTO-MCNC: NEGATIVE MG/DL
HCT VFR BLD AUTO: 45.4 % (ref 42–52)
HGB BLD-MCNC: 15.6 G/DL (ref 14–18)
HYALINE CASTS #/AREA URNS LPF: ABNORMAL /LPF
IMM GRANULOCYTES # BLD AUTO: 0.03 K/UL (ref 0–0.11)
IMM GRANULOCYTES NFR BLD AUTO: 0.3 % (ref 0–0.9)
KETONES UR STRIP.AUTO-MCNC: NEGATIVE MG/DL
LEUKOCYTE ESTERASE UR QL STRIP.AUTO: ABNORMAL
LIPASE SERPL-CCNC: 31 U/L (ref 11–82)
LYMPHOCYTES # BLD AUTO: 2.26 K/UL (ref 1–4.8)
LYMPHOCYTES NFR BLD: 24.5 % (ref 22–41)
MCH RBC QN AUTO: 30.1 PG (ref 27–33)
MCHC RBC AUTO-ENTMCNC: 34.4 G/DL (ref 33.7–35.3)
MCV RBC AUTO: 87.6 FL (ref 81.4–97.8)
MICRO URNS: ABNORMAL
MONOCYTES # BLD AUTO: 0.73 K/UL (ref 0–0.85)
MONOCYTES NFR BLD AUTO: 7.9 % (ref 0–13.4)
NEUTROPHILS # BLD AUTO: 6.03 K/UL (ref 1.82–7.42)
NEUTROPHILS NFR BLD: 65.5 % (ref 44–72)
NITRITE UR QL STRIP.AUTO: NEGATIVE
NRBC # BLD AUTO: 0 K/UL
NRBC BLD-RTO: 0 /100 WBC
PH UR STRIP.AUTO: 7 [PH] (ref 5–8)
PLATELET # BLD AUTO: 369 K/UL (ref 164–446)
PMV BLD AUTO: 9.3 FL (ref 9–12.9)
POTASSIUM SERPL-SCNC: 3.5 MMOL/L (ref 3.6–5.5)
PROT SERPL-MCNC: 7.5 G/DL (ref 6–8.2)
PROT UR QL STRIP: NEGATIVE MG/DL
RBC # BLD AUTO: 5.18 M/UL (ref 4.7–6.1)
RBC # URNS HPF: ABNORMAL /HPF
RBC UR QL AUTO: NEGATIVE
SODIUM SERPL-SCNC: 137 MMOL/L (ref 135–145)
SP GR UR STRIP.AUTO: 1.02
UROBILINOGEN UR STRIP.AUTO-MCNC: 1 MG/DL
WBC # BLD AUTO: 9.2 K/UL (ref 4.8–10.8)
WBC #/AREA URNS HPF: ABNORMAL /HPF

## 2020-03-18 PROCEDURE — 85025 COMPLETE CBC W/AUTO DIFF WBC: CPT

## 2020-03-18 PROCEDURE — 700117 HCHG RX CONTRAST REV CODE 255: Performed by: EMERGENCY MEDICINE

## 2020-03-18 PROCEDURE — 71045 X-RAY EXAM CHEST 1 VIEW: CPT

## 2020-03-18 PROCEDURE — 83690 ASSAY OF LIPASE: CPT

## 2020-03-18 PROCEDURE — 80053 COMPREHEN METABOLIC PANEL: CPT

## 2020-03-18 PROCEDURE — A9270 NON-COVERED ITEM OR SERVICE: HCPCS | Performed by: EMERGENCY MEDICINE

## 2020-03-18 PROCEDURE — 700102 HCHG RX REV CODE 250 W/ 637 OVERRIDE(OP): Performed by: EMERGENCY MEDICINE

## 2020-03-18 PROCEDURE — 74177 CT ABD & PELVIS W/CONTRAST: CPT

## 2020-03-18 PROCEDURE — 81001 URINALYSIS AUTO W/SCOPE: CPT

## 2020-03-18 PROCEDURE — 99284 EMERGENCY DEPT VISIT MOD MDM: CPT

## 2020-03-18 RX ORDER — ONDANSETRON 4 MG/1
4 TABLET, ORALLY DISINTEGRATING ORAL EVERY 8 HOURS PRN
Qty: 10 TAB | Refills: 0 | Status: SHIPPED | OUTPATIENT
Start: 2020-03-18

## 2020-03-18 RX ORDER — IBUPROFEN 600 MG/1
600 TABLET ORAL ONCE
Status: COMPLETED | OUTPATIENT
Start: 2020-03-18 | End: 2020-03-18

## 2020-03-18 RX ADMIN — IOHEXOL 100 ML: 350 INJECTION, SOLUTION INTRAVENOUS at 13:45

## 2020-03-18 RX ADMIN — IBUPROFEN 600 MG: 600 TABLET ORAL at 11:06

## 2020-03-18 ASSESSMENT — FIBROSIS 4 INDEX: FIB4 SCORE: 0.42

## 2020-03-18 NOTE — ED TRIAGE NOTES
Chief Complaint   Patient presents with   • N/V   • Fever     subjective   • Abdominal Pain     Generalized abd pain.  Symptoms X 10 days.  Triage process explained to patient.  Pt back to waiting room.  Pt instructed to inform RN if any changes or questions arise.

## 2020-03-18 NOTE — ED NOTES
..Pt verbalizes understanding of dc instructions. Rx given.. Pt states all questions have been answered and they feel comfortable with dc instructions provided. Pt states has all personal belonging. Pt to lobby w/o incident

## 2020-03-18 NOTE — ED PROVIDER NOTES
ED Provider Note    Scribed for Bill Ross M.D. by Willie Randall. 3/18/2020  10:35 AM    Primary care provider: None noted  Means of arrival: Walk in  History obtained from: Patient  History limited by: None    CHIEF COMPLAINT  Chief Complaint   Patient presents with   • N/V   • Fever     subjective   • Abdominal Pain       [facility]  Justice Ortega is a 34 y.o. male who presents to the Emergency Department for evaluation of moderate, persistent, dry cough onset 10 days. He admits to additional symptoms of post-tussive emesis, mild generalized abdominal pain, and subjective fever, but denies diarrhea. He denies alleviating factors.     REVIEW OF SYSTEMS  Pertinent positives include dry cough, post-tussive emesis, mild generalized abdominal pain, and subjective fever.   Pertinent negatives include no diarrhea.    All other systems reviewed and negative. See HPI for further details.     PAST MEDICAL HISTORY   has a past medical history of Depression and Psychiatric disorder.    SURGICAL HISTORY   has a past surgical history that includes neck exploration (Left, 8/14/2015) and wound closure general (Left, 8/14/2015).    SOCIAL HISTORY  Social History     Tobacco Use   • Smoking status: Current Every Day Smoker     Packs/day: 1.00     Years: 5.00     Pack years: 5.00     Types: Cigarettes   • Smokeless tobacco: Never Used   Substance Use Topics   • Alcohol use: Yes     Comment: Daily   • Drug use: Yes     Types: Inhaled     Comment: meth      Social History     Substance and Sexual Activity   Drug Use Yes   • Types: Inhaled    Comment: meth       FAMILY HISTORY  None noted    CURRENT MEDICATIONS  Current Outpatient Medications:   •  omeprazole (PRILOSEC) 20 MG delayed-release capsule, Take 2 Caps by mouth every day., Disp: 30 Cap, Rfl: 3  •  sucralfate (CARAFATE) 1 GM/10ML Suspension, Take 10 mL by mouth every 6 hours., Disp: 300 mL, Rfl: 0     ALLERGIES  No Known Allergies    PHYSICAL EXAM  VITAL SIGNS: /78    "Pulse (!) 102   Temp 36.3 °C (97.4 °F) (Temporal)   Resp 18   Ht 1.803 m (5' 11\")   Wt 74.3 kg (163 lb 12.8 oz)   SpO2 99%   BMI 22.85 kg/m²   Nursing notes and vitals reviewed.   Constitutional :  Agitated. Well developed, Well nourished, Mild distress, Non-toxic appearance.   HENT: Normocephalic, atraumatic, clear rhinorrhea, posterior pharynx is benign, no tonsillar swelling or exudate, uvula is midline.  Eyes: Normal conjunctiva, no exudate.  Neck: Normal range of motion, No tenderness, Supple, No stridor. No meningismus.  Lymphatic: No palpable adenopathy.   Cardiovascular: Tachycardic heart rate, Normal rhythm, No murmurs, No rubs, No gallops.   Thorax & Lungs: Clear to ascultation bilaterally. No rales, rhonchi, or wheeze. Occasional dry cough noted.   Skin: Warm, Dry, No erythema, No rash.   Extremities: Intact distal pulses, No edema, No tenderness, No cyanosis, No clubbing.     DIAGNOSTIC STUDIES / PROCEDURES    LABS  Results for orders placed or performed during the hospital encounter of 03/18/20   CBC WITH DIFFERENTIAL   Result Value Ref Range    WBC 9.2 4.8 - 10.8 K/uL    RBC 5.18 4.70 - 6.10 M/uL    Hemoglobin 15.6 14.0 - 18.0 g/dL    Hematocrit 45.4 42.0 - 52.0 %    MCV 87.6 81.4 - 97.8 fL    MCH 30.1 27.0 - 33.0 pg    MCHC 34.4 33.7 - 35.3 g/dL    RDW 39.8 35.9 - 50.0 fL    Platelet Count 369 164 - 446 K/uL    MPV 9.3 9.0 - 12.9 fL    Neutrophils-Polys 65.50 44.00 - 72.00 %    Lymphocytes 24.50 22.00 - 41.00 %    Monocytes 7.90 0.00 - 13.40 %    Eosinophils 1.50 0.00 - 6.90 %    Basophils 0.30 0.00 - 1.80 %    Immature Granulocytes 0.30 0.00 - 0.90 %    Nucleated RBC 0.00 /100 WBC    Neutrophils (Absolute) 6.03 1.82 - 7.42 K/uL    Lymphs (Absolute) 2.26 1.00 - 4.80 K/uL    Monos (Absolute) 0.73 0.00 - 0.85 K/uL    Eos (Absolute) 0.14 0.00 - 0.51 K/uL    Baso (Absolute) 0.03 0.00 - 0.12 K/uL    Immature Granulocytes (abs) 0.03 0.00 - 0.11 K/uL    NRBC (Absolute) 0.00 K/uL   COMP METABOLIC PANEL "   Result Value Ref Range    Sodium 137 135 - 145 mmol/L    Potassium 3.5 (L) 3.6 - 5.5 mmol/L    Chloride 100 96 - 112 mmol/L    Co2 23 20 - 33 mmol/L    Anion Gap 14.0 7.0 - 16.0    Glucose 94 65 - 99 mg/dL    Bun 14 8 - 22 mg/dL    Creatinine 0.80 0.50 - 1.40 mg/dL    Calcium 9.2 8.5 - 10.5 mg/dL    AST(SGOT) 18 12 - 45 U/L    ALT(SGPT) 21 2 - 50 U/L    Alkaline Phosphatase 126 (H) 30 - 99 U/L    Total Bilirubin 0.6 0.1 - 1.5 mg/dL    Albumin 4.3 3.2 - 4.9 g/dL    Total Protein 7.5 6.0 - 8.2 g/dL    Globulin 3.2 1.9 - 3.5 g/dL    A-G Ratio 1.3 g/dL   LIPASE   Result Value Ref Range    Lipase 31 11 - 82 U/L   URINALYSIS,CULTURE IF INDICATED   Result Value Ref Range    Color Yellow     Character Clear     Specific Gravity 1.020 <1.035    Ph 7.0 5.0 - 8.0    Glucose Negative Negative mg/dL    Ketones Negative Negative mg/dL    Protein Negative Negative mg/dL    Bilirubin Negative Negative    Urobilinogen, Urine 1.0 Negative    Nitrite Negative Negative    Leukocyte Esterase Trace (A) Negative    Occult Blood Negative Negative    Micro Urine Req Microscopic    URINE MICROSCOPIC (W/UA)   Result Value Ref Range    WBC 0-2 (A) /hpf    RBC 0-2 (A) /hpf    Bacteria Negative None /hpf    Epithelial Cells Negative /hpf    Hyaline Cast 0-2 /lpf   ESTIMATED GFR   Result Value Ref Range    GFR If African American >60 >60 mL/min/1.73 m 2    GFR If Non African American >60 >60 mL/min/1.73 m 2      All labs reviewed by me.    RADIOLOGY  CT-ABDOMEN-PELVIS WITH   Final Result      No acute intra-abdominal abnormality is seen.      DX-CHEST-PORTABLE (1 VIEW)   Final Result      No acute cardiopulmonary abnormality.        The radiologist's interpretation of all radiological studies have been reviewed by me.    COURSE & MEDICAL DECISION MAKING  Nursing notes, VS, PMSFHx reviewed in chart.     10:35 AM - Patient seen and examined at bedside. The patient is agitated and fidgety; he has a history of methamphetamine abuse. Abdominal pain  seems to be related to coughing. Vomiting seems to be primarily post-tussive in nature. I informed the patient of my plan to run diagnostic studies to evaluate their symptoms including x-ray and labs. Patient verbalizes understanding and support with my plan of care.  Ordered DX-Chest, CBC with diff, CMP, Lipase, UA, Culture if Indicated to evaluate his symptoms. The differential diagnoses include but are not limited to: Pneumonia, URI     11:00 AM - The patient will be treated with ibuprofen 600 mg tab.     1:24 PM - Nursing staff informed me that the patient is having continued abdominal pain. Ordered CT-Abdomen-Pelvis to further evaluate.    I reevaluated the patient at bedside. The patient informs me they feel improved following ibuprofen administration. I discussed the patient's diagnostic study results which show a negative chest x-ray and CT scan. The patient verbalizes they feel comfortable going home. The patient is stable for discharge at this time and will return for any new or worsening symptoms. I discussed plan for discharge and follow up as outlined below. Patient verbalizes understanding and support with my plan for discharge.      The patient will return for new or worsening symptoms and is stable at the time of discharge.    DISPOSITION:  Patient will be discharged home in stable condition.    FOLLOW UP:  [facility], Emergency Dept  [facility] [facility] [address] [phone]    If symptoms worsen      OUTPATIENT MEDICATIONS:  New Prescriptions    ONDANSETRON (ZOFRAN ODT) 4 MG TABLET DISPERSIBLE    Take 1 Tab by mouth every 8 hours as needed.        FINAL IMPRESSION  1. Abdominal pain, unspecified abdominal location    2. Cough    3. Upper respiratory tract infection, unspecified type          I, Willie Coley), kain scribing for, and in the presence of, Bill Ross M.D..    Electronically signed by: Willie Coley), 3/18/2020    Bill TARIQ  TC Ross. personally performed the services described in this documentation, as scribed by Willie Randall in my presence, and it is both accurate and complete.    The note accurately reflects work and decisions made by me.  Bill Ross M.D.  3/18/2020  2:35 PM

## 2020-03-18 NOTE — ED NOTES
"Pt held down food and water but states \"i'm in chriss pain\" however pt was sleeping when I entered room  "

## 2021-05-20 ENCOUNTER — HOSPITAL ENCOUNTER (EMERGENCY)
Dept: HOSPITAL 8 - ED | Age: 36
Discharge: HOME | End: 2021-05-20
Payer: MEDICAID

## 2021-05-20 VITALS — DIASTOLIC BLOOD PRESSURE: 77 MMHG | SYSTOLIC BLOOD PRESSURE: 124 MMHG

## 2021-05-20 VITALS — WEIGHT: 143.52 LBS | HEIGHT: 71 IN | BODY MASS INDEX: 20.09 KG/M2

## 2021-05-20 DIAGNOSIS — Z59.0: ICD-10-CM

## 2021-05-20 DIAGNOSIS — K52.9: Primary | ICD-10-CM

## 2021-05-20 LAB
ALBUMIN SERPL-MCNC: 4.1 G/DL (ref 3.4–5)
ALP SERPL-CCNC: 113 U/L (ref 45–117)
ALT SERPL-CCNC: 36 U/L (ref 12–78)
ANION GAP SERPL CALC-SCNC: 7 MMOL/L (ref 5–15)
BASOPHILS # BLD AUTO: 0.1 X10^3/UL (ref 0–0.1)
BASOPHILS NFR BLD AUTO: 0 % (ref 0–1)
BILIRUB SERPL-MCNC: 0.9 MG/DL (ref 0.2–1)
CALCIUM SERPL-MCNC: 9.1 MG/DL (ref 8.5–10.1)
CHLORIDE SERPL-SCNC: 92 MMOL/L (ref 98–107)
CREAT SERPL-MCNC: 1.13 MG/DL (ref 0.7–1.3)
EOSINOPHIL # BLD AUTO: 0.1 X10^3/UL (ref 0–0.4)
EOSINOPHIL NFR BLD AUTO: 0 % (ref 1–7)
ERYTHROCYTE [DISTWIDTH] IN BLOOD BY AUTOMATED COUNT: 12.8 % (ref 9.4–14.8)
LYMPHOCYTES # BLD AUTO: 2.3 X10^3/UL (ref 1–3.4)
LYMPHOCYTES NFR BLD AUTO: 14 % (ref 22–44)
MCH RBC QN AUTO: 31.4 PG (ref 27.5–34.5)
MCHC RBC AUTO-ENTMCNC: 35.6 G/DL (ref 33.2–36.2)
MD: NO
MONOCYTES # BLD AUTO: 1.1 X10^3/UL (ref 0.2–0.8)
MONOCYTES NFR BLD AUTO: 7 % (ref 2–9)
NEUTROPHILS # BLD AUTO: 12.8 X10^3/UL (ref 1.8–6.8)
NEUTROPHILS NFR BLD AUTO: 78 % (ref 42–75)
PLATELET # BLD AUTO: 378 X10^3/UL (ref 130–400)
PMV BLD AUTO: 8.2 FL (ref 7.4–10.4)
PROT SERPL-MCNC: 8.1 G/DL (ref 6.4–8.2)
RBC # BLD AUTO: 5.83 X10^6/UL (ref 4.38–5.82)

## 2021-05-20 PROCEDURE — 80320 DRUG SCREEN QUANTALCOHOLS: CPT

## 2021-05-20 PROCEDURE — 85025 COMPLETE CBC W/AUTO DIFF WBC: CPT

## 2021-05-20 PROCEDURE — 80307 DRUG TEST PRSMV CHEM ANLYZR: CPT

## 2021-05-20 PROCEDURE — 80053 COMPREHEN METABOLIC PANEL: CPT

## 2021-05-20 PROCEDURE — 99285 EMERGENCY DEPT VISIT HI MDM: CPT

## 2021-05-20 PROCEDURE — 96374 THER/PROPH/DIAG INJ IV PUSH: CPT

## 2021-05-20 PROCEDURE — 83690 ASSAY OF LIPASE: CPT

## 2021-05-20 PROCEDURE — 36415 COLL VENOUS BLD VENIPUNCTURE: CPT

## 2021-05-20 PROCEDURE — G0480 DRUG TEST DEF 1-7 CLASSES: HCPCS

## 2021-05-20 SDOH — ECONOMIC STABILITY - HOUSING INSECURITY: HOMELESSNESS: Z59.0

## 2021-05-20 NOTE — NUR
PT MEDICATED PER MAR. PT UNABLE TO GIVE URINE AT THIS TIME. PT INSTRUCTED ON 
HOW TO GIVE URINE SAMPLE. PT RESTING ON ED GURNEY, EYSES CLOSED, EVEN RISE AND 
FALL OF CHEST NOTED. CALL LIGHT ON PT LAP. RAILS UP X 2. VSS. NAD.

## 2024-08-31 ENCOUNTER — EMERGENCY (EMERGENCY)
Facility: HOSPITAL | Age: 39
LOS: 1 days | Discharge: HOME | End: 2024-08-31
Attending: EMERGENCY MEDICINE | Admitting: EMERGENCY MEDICINE
Payer: COMMERCIAL

## 2024-08-31 VITALS
RESPIRATION RATE: 15 BRPM | DIASTOLIC BLOOD PRESSURE: 80 MMHG | TEMPERATURE: 98 F | OXYGEN SATURATION: 100 % | SYSTOLIC BLOOD PRESSURE: 131 MMHG | HEART RATE: 60 BPM

## 2024-08-31 VITALS
OXYGEN SATURATION: 100 % | WEIGHT: 179.02 LBS | SYSTOLIC BLOOD PRESSURE: 145 MMHG | HEART RATE: 71 BPM | DIASTOLIC BLOOD PRESSURE: 89 MMHG | TEMPERATURE: 98 F | RESPIRATION RATE: 16 BRPM

## 2024-08-31 LAB
ALBUMIN SERPL ELPH-MCNC: 4.3 G/DL — SIGNIFICANT CHANGE UP (ref 3.3–5)
ALP SERPL-CCNC: 57 U/L — SIGNIFICANT CHANGE UP (ref 40–120)
ALT FLD-CCNC: 44 U/L — HIGH (ref 4–41)
ANION GAP SERPL CALC-SCNC: 15 MMOL/L — HIGH (ref 7–14)
AST SERPL-CCNC: 31 U/L — SIGNIFICANT CHANGE UP (ref 4–40)
BASOPHILS # BLD AUTO: 0.05 K/UL — SIGNIFICANT CHANGE UP (ref 0–0.2)
BASOPHILS NFR BLD AUTO: 0.9 % — SIGNIFICANT CHANGE UP (ref 0–2)
BILIRUB SERPL-MCNC: 0.2 MG/DL — SIGNIFICANT CHANGE UP (ref 0.2–1.2)
BUN SERPL-MCNC: 8 MG/DL — SIGNIFICANT CHANGE UP (ref 7–23)
CALCIUM SERPL-MCNC: 9.3 MG/DL — SIGNIFICANT CHANGE UP (ref 8.4–10.5)
CHLORIDE SERPL-SCNC: 106 MMOL/L — SIGNIFICANT CHANGE UP (ref 98–107)
CO2 SERPL-SCNC: 21 MMOL/L — LOW (ref 22–31)
CREAT SERPL-MCNC: 1.04 MG/DL — SIGNIFICANT CHANGE UP (ref 0.5–1.3)
EGFR: 94 ML/MIN/1.73M2 — SIGNIFICANT CHANGE UP
EOSINOPHIL # BLD AUTO: 0.15 K/UL — SIGNIFICANT CHANGE UP (ref 0–0.5)
EOSINOPHIL NFR BLD AUTO: 2.8 % — SIGNIFICANT CHANGE UP (ref 0–6)
GLUCOSE SERPL-MCNC: 101 MG/DL — HIGH (ref 70–99)
HCT VFR BLD CALC: 43.1 % — SIGNIFICANT CHANGE UP (ref 39–50)
HGB BLD-MCNC: 15 G/DL — SIGNIFICANT CHANGE UP (ref 13–17)
IANC: 2.74 K/UL — SIGNIFICANT CHANGE UP (ref 1.8–7.4)
IMM GRANULOCYTES NFR BLD AUTO: 0.4 % — SIGNIFICANT CHANGE UP (ref 0–0.9)
LYMPHOCYTES # BLD AUTO: 2.02 K/UL — SIGNIFICANT CHANGE UP (ref 1–3.3)
LYMPHOCYTES # BLD AUTO: 37.3 % — SIGNIFICANT CHANGE UP (ref 13–44)
MCHC RBC-ENTMCNC: 30.5 PG — SIGNIFICANT CHANGE UP (ref 27–34)
MCHC RBC-ENTMCNC: 34.8 GM/DL — SIGNIFICANT CHANGE UP (ref 32–36)
MCV RBC AUTO: 87.6 FL — SIGNIFICANT CHANGE UP (ref 80–100)
MONOCYTES # BLD AUTO: 0.44 K/UL — SIGNIFICANT CHANGE UP (ref 0–0.9)
MONOCYTES NFR BLD AUTO: 8.1 % — SIGNIFICANT CHANGE UP (ref 2–14)
NEUTROPHILS # BLD AUTO: 2.74 K/UL — SIGNIFICANT CHANGE UP (ref 1.8–7.4)
NEUTROPHILS NFR BLD AUTO: 50.5 % — SIGNIFICANT CHANGE UP (ref 43–77)
NRBC # BLD: 0 /100 WBCS — SIGNIFICANT CHANGE UP (ref 0–0)
NRBC # FLD: 0 K/UL — SIGNIFICANT CHANGE UP (ref 0–0)
PLATELET # BLD AUTO: 223 K/UL — SIGNIFICANT CHANGE UP (ref 150–400)
POTASSIUM SERPL-MCNC: 4.2 MMOL/L — SIGNIFICANT CHANGE UP (ref 3.5–5.3)
POTASSIUM SERPL-SCNC: 4.2 MMOL/L — SIGNIFICANT CHANGE UP (ref 3.5–5.3)
PROT SERPL-MCNC: 6.6 G/DL — SIGNIFICANT CHANGE UP (ref 6–8.3)
RBC # BLD: 4.92 M/UL — SIGNIFICANT CHANGE UP (ref 4.2–5.8)
RBC # FLD: 11.9 % — SIGNIFICANT CHANGE UP (ref 10.3–14.5)
SODIUM SERPL-SCNC: 142 MMOL/L — SIGNIFICANT CHANGE UP (ref 135–145)
TROPONIN T, HIGH SENSITIVITY RESULT: <6 NG/L — SIGNIFICANT CHANGE UP
WBC # BLD: 5.42 K/UL — SIGNIFICANT CHANGE UP (ref 3.8–10.5)
WBC # FLD AUTO: 5.42 K/UL — SIGNIFICANT CHANGE UP (ref 3.8–10.5)

## 2024-08-31 PROCEDURE — 99285 EMERGENCY DEPT VISIT HI MDM: CPT

## 2024-08-31 PROCEDURE — 71046 X-RAY EXAM CHEST 2 VIEWS: CPT | Mod: 26

## 2024-08-31 PROCEDURE — 93010 ELECTROCARDIOGRAM REPORT: CPT

## 2024-08-31 NOTE — ED PROVIDER NOTE - NSFOLLOWUPINSTRUCTIONS_ED_ALL_ED_FT
Today you were seen in the emergency department for evaluation of chest pain.     Chest pain can be caused by many different conditions which may or may not be dangerous. Causes include heartburn, lung infections, heart attack, blood clot in lungs, skin infections, strain or damage to muscle, cartilage, or bones, etc. In addition to a history and physical examination, an electrocardiogram (ECG) or other lab tests may have been performed to determine the cause of your chest pain.     Follow up with your primary care provider or with a cardiologist as instructed.     We placed a referral for a cardiologist, please look out for a phone call from the hospital for help with setting this up.     SEEK IMMEDIATE MEDICAL CARE IF YOU HAVE ANY OF THE FOLLOWING SYMPTOMS: worsening chest pain, coughing up blood, unexplained back/neck/jaw pain, severe abdominal pain, dizziness or lightheadedness, fainting, shortness of breath, sweaty or clammy skin, vomiting, or racing heart beat. These symptoms may represent a serious problem that is an emergency. Do not wait to see if the symptoms will go away. Get medical help right away. Call 911 and do not drive yourself to the hospital.

## 2024-08-31 NOTE — ED PROVIDER NOTE - PATIENT PORTAL LINK FT
You can access the FollowMyHealth Patient Portal offered by [facility] by registering at the following website: [email or web address]. By joining [employer]’s FollowMyHealth portal, you will also be able to view your health information using other applications (apps) compatible with our system.

## 2024-08-31 NOTE — ED PROVIDER NOTE - OBJECTIVE STATEMENT
38-year-old male with no significant past medical history presents to emergency department for evaluation of left-sided chest pain onset 4 hours ago.  Patient states he was at home when symptoms began, felt like "stabbing through my heart".  Symptoms were not better or worse with exertion, he went out to dinner and continued to have symptoms, they then became more severe prompting activation of EMS.  Was given aspirin and nitro prior to arrival which helped with his symptoms.  Currently in the ED states chest pain has lessened in intensity but is still present.  Family history of dad with MI requiring stents.  No family history of cardiac disease in 30s or 40s.  Fevers, chills, chills, shortness of breath, cough, back pain, diaphoresis, nausea, or dizziness.  Never seen a cardiologist.  Follows up with his PCP annually.

## 2024-08-31 NOTE — ED PROVIDER NOTE - CLINICAL SUMMARY MEDICAL DECISION MAKING FREE TEXT BOX
Bonnie: Runs 3 times/wk w/o limitation. Tonight, L CP lasting 30 min, then resolved and returned. No significant dizziness, sweating, nausea, or shortness of breath. Not likely PNA: no infectious Sx (eg, purulent cough). Not likely PE or other VTE: low PERC or Wells score, EKG no e/o R-heart strain. HEART score low. VS unremarkable. PE unremarkable. Check trop, EKG, CXR. If unremarkable, then dc home.

## 2024-08-31 NOTE — ED ADULT NURSE NOTE - NS ED NURSE RECORD ANOTHER HT AND WT
Follow-Up Note - Sleep Center   Tino Tan  38 y o  male  :1981  FGK:[id number]  DOS:2022    CC: I saw this patient for follow-up in clinic today for Sleep disordered breathing, Coexisting Sleep and Medical Problems  He is using a dream Station 1 machine that he got in   A diagnostic study 2019 demonstrated Mild obstructive sleep apnea: AHI is 7 7 /hour , higher while supine and considerably higher during stage REM at 18 per hour so in the dreams sleep  Intermittent snoring of loud intensity was noted  Minimum oxygen saturation 73% and then 9 3 minute of total sleep time was spent with saturations less than 90%  Sleep latency was short at 0 1 minutes  Sleep efficiency was better than normal at 94 3%  Auto titrating Pap was initiated  PFSH, Problem List, Medications & Allergies were reviewed in EMR  Interval changes: none reported  He  has a past medical history of Psychiatric disorder  He has a current medication list which includes the following prescription(s): aripiprazole, cephalexin, lamotrigine, and venlafaxine  PHYSIOLOGICAL DATA REVIEW AND INTERPRETATION:    using PAP > 4 hours/night 33%  Machine was used for 25 out of the past 30 days and average on days used is 3-1/2 hours  Estimated BRITNI 2 2/hour with pressure of 11cm H2O @90th percentile; Patient has not been using ozone based devices to sanitize the machine  SUBJECTIVE: Regarding use of PAP, Sally Arrietaas reports:   · He is experiencing some adverse effects: mask leaks ; has not noticed any fibres or foreign material in air line  · He is benefiting from use: sleeping better   Sleep Routine: Sally Villeda reports getting 5-6 hrs sleep because of work commitments; he has no difficulty initiating or maintaining sleep   He arises spontaneously and is not always refreshed  Sallyvioleta Villeda reports Excessive Daytime Sleepiness, feels like napping but does not have the opportunity   He rated himself at Total score: 18 /24 on the [place] Sleepiness Scale  Habits: reports that he has never smoked  He has never used smokeless tobacco ,  reports no history of alcohol use ,  reports no history of drug use , Caffeine use:limited ; Exercise routine: regular    ROS: reviewed & as attached  Significant for over 20 lb weight gain since his last visit  He reported no nasal, respiratory or cardiac symptoms  He reported no nasal, respiratory or cardiac symptoms  Mood is stable on current medications  EXAM: /78   Pulse 84   Ht 5' 10" (1 778 m)   Wt 112 kg (246 lb)   SpO2 98%   BMI 35 30 kg/m²     Wt Readings from Last 3 Encounters:   06/21/22 112 kg (246 lb)   06/08/21 99 8 kg (220 lb)   06/02/20 99 8 kg (220 lb)      Patient is well groomed; well appearing  CNS: Alert, orientated, clear & coherent speech  Psych: cooperativeand in no distress  Mental state:appears normal   H&N: EOMI; NC/AT:no facial pressure marks, no rashes  Skin/Extrem: col & hydration normal; no edema  Resp: Respiratory effort is normal  Physical findings otherwise essentially unchanged from previous  IMPRESSION: Problem List Items & Comorbidities Addressed this Visit    1  Obstructive sleep apnea (adult) (pediatric)  Ambulatory Referral to Sleep Medicine    PAP DME Resupply/Reorder   2  CPAP use counseling     3  Other hypersomnia  Ambulatory Referral to Sleep Medicine   4  Insufficient sleep syndrome     5  Depression, unspecified depression type     6  Dry mouth     7  Deviated nasal septum     8  Obesity, unspecified  Ambulatory Referral to Sleep Medicine       PLAN:  I reviewed results of prior studies and physiologic data with the patient  Notified of [facility] devices recall and their recommendation to discontinue use  Risks of discontinuing PAP vs continuing while awaiting resolution were explained and patient indicates understanding  I discussed treatment options with risks and benefits     Patient elected to  continue using Pap while awaiting remediation  Instructed  to register machine with [facility] & track progress on Leidy Adame  Care of equipment, methods to improve comfort using PAP and importance of compliance with therapy were discussed  Instructed not to use ozone based or other unapproved  cleaning devices  Pressure settin-12 cmH2O  Rx provided to replace  supplies and Care coordinated with DME provider  Discussed strategies for weight reduction  Also advised allowing sufficient opportunity for sleep  Follow-up is advised in 1 year or sooner if needed to monitor progress, compliance and to adjust therapy  Thank you for allowing me to participate in the care of this patient  Sincerely,    Authenticated electronically by Elan Wallace MD on    Board Certified Specialist     Portions of the record may have been created with voice recognition software  Occasional wrong word or "sound a like" substitutions may have occurred due to the inherent limitations of voice recognition software  There may also be notations and random deletions of words or characters from malfunctioning software  Read the chart carefully and recognize, using context, where substitutions/deletions have occurred  No

## 2024-08-31 NOTE — ED ADULT NURSE NOTE - NSFALLUNIVINTERV_ED_ALL_ED
Bed/Stretcher in lowest position, wheels locked, appropriate side rails in place/Call bell, personal items and telephone in reach/Instruct patient to call for assistance before getting out of bed/chair/stretcher/Non-slip footwear applied when patient is off stretcher/Rand to call system/Physically safe environment - no spills, clutter or unnecessary equipment/Purposeful proactive rounding/Room/bathroom lighting operational, light cord in reach

## 2024-08-31 NOTE — ED ADULT NURSE NOTE - OBJECTIVE STATEMENT
received pt to rm7. A&OX4 RR even unlabored completing full clear sentences. presents c/o L sided CP x 5 hours. en route with EMS pt was given 1 spray nitro and 324 ASA with relief of symptoms. no past medical hx reported. on assessment denies h/a, dizziness/lightheadedness, abd pain, n/v/d, fevers/chills, falls/trauma, gu sx, recent travel, sick contacts. labs collected and sent as ordered. arrives with 20gIV to L hand, no signs of infiltration. placed on CM, NSR noted with HR in the 60s. vs per flowsheet. safety measures maintained throughout care. pt awaiting XR at this time

## 2024-08-31 NOTE — ED PROVIDER NOTE - ATTENDING CONTRIBUTION TO CARE
I performed a face-to-face evaluation of the patient and performed a history and physical examination. I agree with the history and physical examination. If this was a PA visit, I personally saw the patient with the PA and performed a substantive portion of the visit including all aspects of the medical decision making.    Runs 3 times/wk w/o limitation. Tonight, L CP lasting 30 min, then resolved and returned. No significant dizziness, sweating, nausea, or shortness of breath. Not likely PNA: no infectious Sx (eg, purulent cough). Not likely PE or other VTE: low PERC or Wells score, EKG no e/o R-heart strain. HEART score low. VS unremarkable. PE unremarkable. Check trop, EKG, CXR. If unremarkable, then dc home.

## 2025-04-17 ENCOUNTER — HOSPITAL ENCOUNTER (EMERGENCY)
Facility: MEDICAL CENTER | Age: 40
End: 2025-04-17
Attending: EMERGENCY MEDICINE

## 2025-04-17 VITALS
HEART RATE: 84 BPM | HEIGHT: 67 IN | WEIGHT: 140 LBS | BODY MASS INDEX: 21.97 KG/M2 | RESPIRATION RATE: 18 BRPM | SYSTOLIC BLOOD PRESSURE: 110 MMHG | TEMPERATURE: 97.9 F | DIASTOLIC BLOOD PRESSURE: 74 MMHG | OXYGEN SATURATION: 96 %

## 2025-04-17 DIAGNOSIS — R41.82 ALTERED MENTAL STATUS, UNSPECIFIED ALTERED MENTAL STATUS TYPE: ICD-10-CM

## 2025-04-17 LAB
ALBUMIN SERPL BCP-MCNC: 4.2 G/DL (ref 3.2–4.9)
ALBUMIN/GLOB SERPL: 1.6 G/DL
ALP SERPL-CCNC: 86 U/L (ref 30–99)
ALT SERPL-CCNC: 22 U/L (ref 2–50)
ANION GAP SERPL CALC-SCNC: 10 MMOL/L (ref 7–16)
AST SERPL-CCNC: 27 U/L (ref 12–45)
BASOPHILS # BLD AUTO: 0.3 % (ref 0–1.8)
BASOPHILS # BLD: 0.03 K/UL (ref 0–0.12)
BILIRUB SERPL-MCNC: 0.6 MG/DL (ref 0.1–1.5)
BUN SERPL-MCNC: 17 MG/DL (ref 8–22)
CALCIUM ALBUM COR SERPL-MCNC: 8.7 MG/DL (ref 8.5–10.5)
CALCIUM SERPL-MCNC: 8.9 MG/DL (ref 8.5–10.5)
CHLORIDE SERPL-SCNC: 105 MMOL/L (ref 96–112)
CO2 SERPL-SCNC: 22 MMOL/L (ref 20–33)
CREAT SERPL-MCNC: 0.86 MG/DL (ref 0.5–1.4)
EKG IMPRESSION: NORMAL
EOSINOPHIL # BLD AUTO: 0.07 K/UL (ref 0–0.51)
EOSINOPHIL NFR BLD: 0.6 % (ref 0–6.9)
ERYTHROCYTE [DISTWIDTH] IN BLOOD BY AUTOMATED COUNT: 45.1 FL (ref 35.9–50)
ETHANOL BLD-MCNC: <10.1 MG/DL
GFR SERPLBLD CREATININE-BSD FMLA CKD-EPI: 112 ML/MIN/1.73 M 2
GLOBULIN SER CALC-MCNC: 2.6 G/DL (ref 1.9–3.5)
GLUCOSE SERPL-MCNC: 102 MG/DL (ref 65–99)
HCT VFR BLD AUTO: 44.2 % (ref 42–52)
HGB BLD-MCNC: 15 G/DL (ref 14–18)
IMM GRANULOCYTES # BLD AUTO: 0.04 K/UL (ref 0–0.11)
IMM GRANULOCYTES NFR BLD AUTO: 0.3 % (ref 0–0.9)
LYMPHOCYTES # BLD AUTO: 2.03 K/UL (ref 1–4.8)
LYMPHOCYTES NFR BLD: 17.3 % (ref 22–41)
MCH RBC QN AUTO: 30.6 PG (ref 27–33)
MCHC RBC AUTO-ENTMCNC: 33.9 G/DL (ref 32.3–36.5)
MCV RBC AUTO: 90.2 FL (ref 81.4–97.8)
MONOCYTES # BLD AUTO: 0.78 K/UL (ref 0–0.85)
MONOCYTES NFR BLD AUTO: 6.6 % (ref 0–13.4)
NEUTROPHILS # BLD AUTO: 8.79 K/UL (ref 1.82–7.42)
NEUTROPHILS NFR BLD: 74.9 % (ref 44–72)
NRBC # BLD AUTO: 0 K/UL
NRBC BLD-RTO: 0 /100 WBC (ref 0–0.2)
PLATELET # BLD AUTO: 241 K/UL (ref 164–446)
PMV BLD AUTO: 9.8 FL (ref 9–12.9)
POTASSIUM SERPL-SCNC: 4 MMOL/L (ref 3.6–5.5)
PROT SERPL-MCNC: 6.8 G/DL (ref 6–8.2)
RBC # BLD AUTO: 4.9 M/UL (ref 4.7–6.1)
SODIUM SERPL-SCNC: 137 MMOL/L (ref 135–145)
TROPONIN T SERPL-MCNC: <6 NG/L (ref 6–19)
WBC # BLD AUTO: 11.7 K/UL (ref 4.8–10.8)

## 2025-04-17 PROCEDURE — 80053 COMPREHEN METABOLIC PANEL: CPT

## 2025-04-17 PROCEDURE — 85025 COMPLETE CBC W/AUTO DIFF WBC: CPT

## 2025-04-17 PROCEDURE — 84484 ASSAY OF TROPONIN QUANT: CPT

## 2025-04-17 PROCEDURE — 82077 ASSAY SPEC XCP UR&BREATH IA: CPT

## 2025-04-17 PROCEDURE — 36415 COLL VENOUS BLD VENIPUNCTURE: CPT

## 2025-04-17 PROCEDURE — 93005 ELECTROCARDIOGRAM TRACING: CPT | Mod: TC | Performed by: EMERGENCY MEDICINE

## 2025-04-17 PROCEDURE — 99284 EMERGENCY DEPT VISIT MOD MDM: CPT

## 2025-04-17 NOTE — ED TRIAGE NOTES
Chief Complaint   Patient presents with    [facility] from [facility]. Pt was noted by friends to become increasing somnolent following large consumption of Meth and Marijuana today.      Pt responsive to painful stimuli.

## 2025-04-17 NOTE — ED NOTES
Report from [facility], pt has just arrived and still awaiting ERP  eval   BIBA from [facility], pt had admitted to using meth and mj earlier today  Pt drowsy and wakes to painful stimuli but stable and VSS on RA  Pt has PIV in place from EMS  Fall risk precautions in place, bed alarm on and armed

## 2025-04-17 NOTE — ED NOTES
Unable to address med rec. Patient is unable to participate in interview. Pharmacy on file for patient (in chart marked for merge) is [facility] on [address] ([phone]). [facility] states they have not filled any medications for this patient for over 4 years. Unable to verify whether patient fills at any other pharmacy.

## 2025-04-17 NOTE — ED NOTES
Pt wakes to painful stimuli, but still somnolent, on obs pending pt more awake and alert  Pt still sleeping in NAD at this time, VSS on RA  Fall risk precautions in place, bed alarm on and armed

## 2025-04-17 NOTE — ED PROVIDER NOTES
"ED Provider Note    CHIEF COMPLAINT  Chief Complaint   Patient presents with    ALOC     BIB EMS from [facility]. Pt was noted by friends to become increasing somnolent following large consumption of Meth and Marijuana today.        EXTERNAL RECORDS REVIEWED  Other none available as he is registered under an UIP    HPI/ROS  LIMITATION TO HISTORY   Select: Altered mental status / Confusion  OUTSIDE HISTORIAN(S):  EMS EMS reports that they were called because friends state that he either passed out or fell asleep at the lunch table.    Casimiro Doyle is a 47-year-old man who presents complaining of feeling tired.  He is sleeping when I enter the room, will awaken to my voice answer yes or no question for nodding off back to sleep.  Apparently told the nurses that he took both meth and marijuana.  He tells me he just took marijuana, he smoked.  He says it was not very much.  Denies any recent illness or trauma.    PAST MEDICAL HISTORY   He denies    SURGICAL HISTORY  patient denies any surgical history    FAMILY HISTORY  No family history on file.    SOCIAL HISTORY  Social History     Tobacco Use    Smoking status: Not on file    Smokeless tobacco: Not on file   Substance and Sexual Activity    Alcohol use: Not on file    Drug use: Not on file    Sexual activity: Not on file       CURRENT MEDICATIONS  Home Medications       Reviewed by Dorita Carr R.N. (Registered Nurse) on 04/17/25 at 1117  Med List Status: Not Addressed     Medication Last Dose Status        Patient Trent Taking any Medications                           ALLERGIES  Not on File    PHYSICAL EXAM  VITAL SIGNS: /73   Pulse 61   Temp 36.6 °C (97.8 °F) (Temporal)   Resp 16   Ht 1.702 m (5' 7\")   Wt 63.5 kg (140 lb)   SpO2 98%   BMI 21.93 kg/m²    Constitutional: Resting/sleeping with his eyes closed.  He opens his eyes to my voice, will answer brief question and then go back to sleep.  HENT: Head is without trauma.  Oropharynx " is clear.  Mucous membranes are moist.  Eyes: Sclerae are nonicteric, pupils are equally round.  Neck: Supple with grossly normal range of motion.  Cardiovascular: Heart is regular rate and rhythm without murmur rub or gallop.  Peripheral pulses are intact and symmetric throughout.  Thorax & Lungs: Breathing easily.  Good air movement.  There is no wheeze, rhonchi or rales.  Abdomen: Bowel sounds normal, soft, non-distended, nontender, no mass nor pulsatile mass. I do not appreciate hepatosplenomegaly.  Skin: No apparent rash.  I do not see petechiae or purpura.  Extremities: No evidence of acute trauma.  No clubbing, cyanosis, edema, no Homans or cords.  Neurologic: Alert. Moving all extremities.  Intact sensation and strength throughout.    Psychiatric: Normal for situation      EKG/LABS  Labs Reviewed   CBC WITH DIFFERENTIAL - Abnormal; Notable for the following components:       Result Value    WBC 11.7 (*)     Neutrophils-Polys 74.90 (*)     Lymphocytes 17.30 (*)     Neutrophils (Absolute) 8.79 (*)     All other components within normal limits   COMP METABOLIC PANEL - Abnormal; Notable for the following components:    Glucose 102 (*)     All other components within normal limits   DIAGNOSTIC ALCOHOL   TROPONIN   ESTIMATED GFR   URINE DRUG SCREEN     To my interpretation is EKG shows a twelve-lead study revealing sinus bradycardia rate of 56, there are no ST segment or T wave changes.  Impression: No STEMI, no evidence of intoxication  I have independently interpreted this EKG    COURSE & MEDICAL DECISION MAKING    ASSESSMENT, COURSE AND PLAN  Care Narrative: This is a patient presents with either passing out or going to sleep at the lunch table.  He has no sign of trauma at all, and his exam is nonfocal and therefore I do not think any brain imaging is indicated.  He states that he is fine just tired, and given his report of drug use, I suspect that this is etiology of symptoms.  However, I will go ahead, out I  am an abundance of caution and check laboratories and a EKG.    Laboratories show a mild leukocytosis and a preponderance neutrophils at 75%, however no bandemia.  Obviously there is no clinical evidence of infectious etiology.  Basic chemistries are unrevealing, glucose elevated at 102 but electrolytes are otherwise normal as is his hepatic and renal function.  Troponin is undetectable.  Alcohol similarly.  Urine drug she was ordered is not yet back at this time.  I checked on the patient multiple times including at 1420.  Patient is still snoozing comfortably.  He opens his eyes to voice and shaking him, but then promptly goes back to sleep.  No new complaints are offered.    At this point, my working diagnosis is still that he is altered from drug ingestion.  His workup and observation has not revealed any reconsideration of this.  Presently we will continue to watch him for sobriety and/or any clinical change for the worse.  Case was turned over to my partner Dr. Alisha MCCLAIN OBS: Yes; I am placing the patient in to an observation status due to a diagnostic uncertainty as well as therapeutic intensity. Patient placed in observation status at 2:20 PM, 4/17/2025.     Observation plan is as follows: Observe for sobriety and or any clinical change for the worse          ADDITIONAL PROBLEMS MANAGED      DISPOSITION AND DISCUSSIONS    Escalation of care considered, and ultimately not performed:IV fluids and diagnostic imaging      Working DIAGNOSIS  1. Altered mental status, unspecified altered mental status type    2.  Marijuana use     Electronically signed by: Richard Lozoya M.D., 4/17/2025 12:37 PM

## 2025-04-17 NOTE — ED NOTES
Pt remains asleep at this time in NAD, VSS on RA  Fall risk precautions in place, bed alarm on and armed

## 2025-04-18 NOTE — DISCHARGE SUMMARY
"  ED Observation Discharge Summary    Patient:John Javier  Patient : 1985  Patient MRN: [id number]  Patient PCP: Pcp Pt States None    Admit Date: 2025  Discharge Date and Time: 25 7:33 PM  Discharge Diagnosis:   1. Altered mental status, unspecified altered mental status type            Discharge Attending: Peggy Brito M.D.  Discharge Service: ED Observation    ED Course  John is a 39 y.o. male who was evaluated at [facility] for altered mental status.  He was signed out to me pending reevaluation when he became clear.  He was reevaluated at this time and he is alert eating a sandwich in no acute distress.  He says he did not do any drugs but he did the other day.  Perhaps he was washing out.  I reviewed his workup which is fairly unremarkable.  Given that he is clearing at this time mentally I do think he can be discharged.        Discharge Exam:  /66   Pulse 92   Temp 36.6 °C (97.8 °F) (Temporal)   Resp 16   Ht 1.702 m (5' 7\")   Wt 63.5 kg (140 lb)   SpO2 96%   BMI 21.93 kg/m² .    Constitutional: Awake and alert. Nontoxic  HENT:  Grossly normal  Eyes: Grossly normal  Neck: Normal range of motion  Cardiovascular: Normal heart rate   Thorax & Lungs: No respiratory distress  Abdomen: Nontender  Skin:  No pathologic rash.   Extremities: Well perfused  Psychiatric: Affect normal    Labs  Results for orders placed or performed during the hospital encounter of 25   CBC WITH DIFFERENTIAL    Collection Time: 25 12:38 PM   Result Value Ref Range    WBC 11.7 (H) 4.8 - 10.8 K/uL    RBC 4.90 4.70 - 6.10 M/uL    Hemoglobin 15.0 14.0 - 18.0 g/dL    Hematocrit 44.2 42.0 - 52.0 %    MCV 90.2 81.4 - 97.8 fL    MCH 30.6 27.0 - 33.0 pg    MCHC 33.9 32.3 - 36.5 g/dL    RDW 45.1 35.9 - 50.0 fL    Platelet Count 241 164 - 446 K/uL    MPV 9.8 9.0 - 12.9 fL    Neutrophils-Polys 74.90 (H) 44.00 - 72.00 %    Lymphocytes 17.30 (L) 22.00 - 41.00 %    Monocytes 6.60 0.00 - " 13.40 %    Eosinophils 0.60 0.00 - 6.90 %    Basophils 0.30 0.00 - 1.80 %    Immature Granulocytes 0.30 0.00 - 0.90 %    Nucleated RBC 0.00 0.00 - 0.20 /100 WBC    Neutrophils (Absolute) 8.79 (H) 1.82 - 7.42 K/uL    Lymphs (Absolute) 2.03 1.00 - 4.80 K/uL    Monos (Absolute) 0.78 0.00 - 0.85 K/uL    Eos (Absolute) 0.07 0.00 - 0.51 K/uL    Baso (Absolute) 0.03 0.00 - 0.12 K/uL    Immature Granulocytes (abs) 0.04 0.00 - 0.11 K/uL    NRBC (Absolute) 0.00 K/uL   COMP METABOLIC PANEL    Collection Time: 25 12:38 PM   Result Value Ref Range    Sodium 137 135 - 145 mmol/L    Potassium 4.0 3.6 - 5.5 mmol/L    Chloride 105 96 - 112 mmol/L    Co2 22 20 - 33 mmol/L    Anion Gap 10.0 7.0 - 16.0    Glucose 102 (H) 65 - 99 mg/dL    Bun 17 8 - 22 mg/dL    Creatinine 0.86 0.50 - 1.40 mg/dL    Calcium 8.9 8.5 - 10.5 mg/dL    Correct Calcium 8.7 8.5 - 10.5 mg/dL    AST(SGOT) 27 12 - 45 U/L    ALT(SGPT) 22 2 - 50 U/L    Alkaline Phosphatase 86 30 - 99 U/L    Total Bilirubin 0.6 0.1 - 1.5 mg/dL    Albumin 4.2 3.2 - 4.9 g/dL    Total Protein 6.8 6.0 - 8.2 g/dL    Globulin 2.6 1.9 - 3.5 g/dL    A-G Ratio 1.6 g/dL   DIAGNOSTIC ALCOHOL    Collection Time: 25 12:38 PM   Result Value Ref Range    Diagnostic Alcohol <10.1 <10.1 mg/dL   TROPONIN    Collection Time: 25 12:38 PM   Result Value Ref Range    Troponin T <6 6 - 19 ng/L   ESTIMATED GFR    Collection Time: 25 12:38 PM   Result Value Ref Range    GFR (CKD-EPI) 112 >60 mL/min/1.73 m 2   EKG    Collection Time: 25 12:48 PM   Result Value Ref Range    Report       [facility] Emergency Dept.    Test Date:  2025  Pt Name:    ADAN NAVAS          Department: ER  MRN:        [id number]                      Room:       [facility]  Gender:                                  Technician: [id number]  :        [id number]                   Requested By:NESTOR PRADO  Order #:    [id number]                    Reading MD:    Measurements  Intervals                                 Axis  Rate:       54                           P:          64  RI:         141                          QRS:        51  QRSD:       88                           T:          45  QT:         485  QTc:        460    Interpretive Statements  Sinus bradycardia  Minimal ST elevation, inferior leads  No previous ECG available for comparison         Radiology  No orders to display       Medications:   New Prescriptions    No medications on file       My final assessment includes significantly improved status.  Upon Reevaluation, the patient's condition has: Improved; and will be discharged.    Patient discharged from ED Observation status at 7:34 PM (Time) 4/17/2025 (Date).     Total time spent on this ED Observation discharge encounter is < 30 Minutes    Electronically signed by: Peggy Brito M.D., 4/17/2025 7:33 PM

## 2025-04-18 NOTE — ED NOTES
Pt moving back and forth, sat up in bed. This RN at BS and pt is more awake and alert and responding to questions, requesting something to eat. Pt given sandwich and water. ERP notified pt awake and alert and can be assessed.   Pt requesting to go home, advised ERP will come evaluate pt

## 2025-04-18 NOTE — ED NOTES
Pt remains asleep at this time in NAD, VSS on RA  Fall risk precautions in place, rails up, bed alarm on and armed  Urinal in reach

## 2025-04-18 NOTE — ED NOTES
.DC home with written and verbal instructions regarding f/u, activity and RX.  Verbalized understanding, ambulated out. Gave a sandwich and apple juice.

## 2025-04-18 NOTE — ED NOTES
Patient's home medications have been reviewed by the pharmacy team.     No past medical history on file.    Patient's Medications    No medications on file          A:  Patient unable to participate in medication history interview   [facility] states they have not filled any medications for this patient for over 4 years.   Unable to verify whether patient fills at any other pharmacy          P:    No recommendations at this time. Defer to psychiatry.     Kylie Valdez, Pharm.D., BCPS